# Patient Record
Sex: FEMALE | Race: WHITE | Employment: UNEMPLOYED | URBAN - METROPOLITAN AREA
[De-identification: names, ages, dates, MRNs, and addresses within clinical notes are randomized per-mention and may not be internally consistent; named-entity substitution may affect disease eponyms.]

---

## 2017-05-31 ENCOUNTER — OFFICE VISIT (OUTPATIENT)
Dept: OBGYN CLINIC | Age: 27
End: 2017-05-31

## 2017-05-31 VITALS
WEIGHT: 176 LBS | BODY MASS INDEX: 33.23 KG/M2 | HEIGHT: 61 IN | SYSTOLIC BLOOD PRESSURE: 118 MMHG | DIASTOLIC BLOOD PRESSURE: 70 MMHG

## 2017-05-31 DIAGNOSIS — Z34.81 ENCOUNTER FOR SUPERVISION OF OTHER NORMAL PREGNANCY, FIRST TRIMESTER: Primary | ICD-10-CM

## 2017-05-31 LAB
ANTIBODY SCREEN, EXTERNAL: POSITIVE
BILIRUB UR QL STRIP: NEGATIVE
CHLAMYDIA, EXTERNAL: NORMAL
GLUCOSE UR-MCNC: NEGATIVE MG/DL
HBSAG, EXTERNAL: NORMAL
HCT, EXTERNAL: 38.1
HGB, EXTERNAL: 13.2
HIV, EXTERNAL: NORMAL
KETONES P FAST UR STRIP-MCNC: NEGATIVE MG/DL
N. GONORRHEA, EXTERNAL: NORMAL
PAP SMEAR, EXTERNAL: NORMAL
PH UR STRIP: NORMAL [PH] (ref 4.6–8)
PLATELET CNT,   EXTERNAL: 306
PROT UR QL STRIP: NEGATIVE MG/DL
RUBELLA, EXTERNAL: NORMAL
SP GR UR STRIP: NORMAL (ref 1–1.03)
T. PALLIDUM, EXTERNAL: NORMAL
UA UROBILINOGEN AMB POC: NORMAL (ref 0.2–1)
URINALYSIS CLARITY POC: NORMAL
URINALYSIS COLOR POC: NORMAL
URINALYSIS, EXTERNAL: NORMAL
URINE BLOOD POC: NEGATIVE
URINE LEUKOCYTES POC: NEGATIVE
URINE NITRITES POC: NEGATIVE

## 2017-05-31 RX ORDER — ONDANSETRON 8 MG/1
8 TABLET, ORALLY DISINTEGRATING ORAL
Qty: 60 TAB | Refills: 2 | Status: SHIPPED | OUTPATIENT
Start: 2017-05-31 | End: 2017-07-21

## 2017-05-31 RX ORDER — DOXYLAMINE SUCCINATE AND PYRIDOXINE HYDROCHLORIDE, DELAYED RELEASE TABLETS 10 MG/10 MG 10; 10 MG/1; MG/1
2 TABLET, DELAYED RELEASE ORAL
Qty: 120 TAB | Refills: 6 | Status: SHIPPED | OUTPATIENT
Start: 2017-05-31 | End: 2017-06-30

## 2017-05-31 NOTE — MR AVS SNAPSHOT
Visit Information Date & Time Provider Department Dept. Phone Encounter #  
 5/31/2017  2:40 PM Vivek Gutierres MD [facility] [phone] Upcoming Health Maintenance Date Due  
 PAP AKA CERVICAL CYTOLOGY 5/23/2017 INFLUENZA AGE 9 TO ADULT 8/1/2017 Allergies as of 5/31/2017  Review Complete On: 5/31/2017 By: Vidya Cookgojamshid Severity Noted Reaction Type Reactions [place]  02/27/2015    Other (comments) Red face and body gets hot Current Immunizations  Never Reviewed Name Date Tdap 6/19/2015 10:51 AM  
  
 Not reviewed this visit You Were Diagnosed With   
  
 Codes Comments Encounter for supervision of other normal pregnancy, first trimester    -  Primary ICD-10-CM: Z34.81 ICD-9-CM: V22.1 Vitals BP Height(growth percentile) Weight(growth percentile) LMP Breastfeeding? BMI  
 118/70 5' 1\" (1.549 m) 176 lb (79.8 kg) 04/04/2017 No 33.25 kg/m2 OB Status Smoking Status Pregnant Never Smoker BMI and BSA Data Body Mass Index Body Surface Area  
 33.25 kg/m 2 1.85 m 2 Preferred Pharmacy Pharmacy Name Phone [facility] [address] [facility] Your Updated Medication List  
  
   
This list is accurate as of: 5/31/17  3:05 PM.  Always use your most recent med list.  
  
  
  
  
 famotidine 20 mg tablet Commonly known as:  PEPCID Take 1 Tab by mouth two (2) times a day. ondansetron 4 mg disintegrating tablet Commonly known as:  ZOFRAN ODT Take 1 Tab by mouth every six (6) hours as needed for Nausea. We Performed the Following AMB POC URINALYSIS DIP STICK MANUAL W/O MICRO [27145 CPT(R)] ANTIBODY SCREEN [id number] CPT(R)] CBC W/O DIFF [69563 CPT(R)] CT/NG/T.VAGINALIS AMPLIFICATION [id number] CPT(R)] CULTURE, URINE [id number] CPT(R)] HEP B SURFACE AG [id number] CPT(R)] HIV 1/2 AG/AB, 4TH GENERATION,W RFLX CONFIRM [[id number] Custom] PAP, IG, RFX HPV ASCUS (131558) [88641 CPT(R)] RUBELLA AB, IGG V4773001 CPT(R)] T PALLIDUM SCREEN W/REFLEX [[id number] Custom] TYPE, ABO & RH [06760 CPT(R)] Patient Instructions Weeks 6 to 10 of Your Pregnancy: Care Instructions Your Care Instructions Congratulations on your pregnancy. This is an exciting and important time for you. During the first 6 to 10 weeks of your pregnancy, your body goes through many changes. Your baby grows very fast, even though you cannot feel it yet. You may start to notice that you feel different, both in your body and your emotions. Because each woman's pregnancy is unique, there is no right way to feel. You may feel the healthiest you have ever been, or you may feel tired or sick to your stomach (\"morning sickness\"). These early weeks are a time to make healthy choices and to eat the best foods for you and your baby. This care sheet will give you some ideas. This is also a good time to think about birth defects testing. These are tests done during pregnancy to look for possible problems with the baby. First trimester tests for birth defects can be done between 8 and 17 weeks of pregnancy, depending on the test. Talk with your doctor about what kinds of tests are available. Follow-up care is a key part of your treatment and safety. Be sure to make and go to all appointments, and call your doctor if you are having problems. It's also a good idea to know your test results and keep a list of the medicines you take. How can you care for yourself at home? Eat well · Eat at least 3 meals and 2 healthy snacks every day. Eat fresh, whole foods, including: ¨ 7 or more servings of bread, tortillas, cereal, rice, pasta, or oatmeal. 
¨ 3 or more servings of vegetables, especially leafy green vegetables. ¨ 2 or more servings of fruits. ¨ 3 or more servings of milk, yogurt, or cheese. ¨ 2 or more servings of meat, turkey, chicken, fish, eggs, or dried beans. · Drink plenty of fluids, especially water. Avoid sodas and other sweetened drinks. · Choose foods that have important vitamins for your baby, such as calcium, iron, and folate. ¨ Dairy products, tofu, canned fish with bones, almonds, broccoli, dark leafy greens, corn tortillas, and fortified orange juice are good sources of calcium. ¨ Beef, poultry, liver, spinach, lentils, dried beans, fortified cereals, and dried fruits are rich in iron. ¨ Dark leafy greens, broccoli, asparagus, liver, fortified cereals, orange juice, peanuts, and almonds are good sources of folate. · Avoid foods that could harm your baby. ¨ Do not eat raw or undercooked meat, chicken, or fish (such as sushi or raw oysters). ¨ Do not eat raw eggs or foods that contain raw eggs, such as Caesar dressing. ¨ Do not eat soft cheeses and unpasteurized dairy foods, such as Brie, feta, or blue cheese. ¨ Do not eat fish that contains a lot of mercury, such as shark, swordfish, tilefish, or royer mackerel. Do not eat more than 6 ounces of tuna each week. ¨ Do not eat raw sprouts, especially alfalfa sprouts. ¨ Cut down on caffeine, such as coffee, tea, and cola. Protect yourself and your baby · Do not touch teetee litter or cat feces. They can cause an infection that could harm your baby. · High body temperature can be harmful to your baby. So if you want to use a sauna or hot tub, be sure to talk to your doctor about how to use it safely. [place] with morning sickness · Sip small amounts of water, juices, or shakes. Try drinking between meals, not with meals. · Eat 5 or 6 small meals a day. Try dry toast or crackers when you first get up, and eat breakfast a little later. · Avoid spicy, greasy, and fatty foods. · When you feel sick, open your windows or go for a short walk to get fresh air. · Try nausea wristbands. These help some women. · Tell your doctor if you think your prenatal vitamins make you sick. Where can you learn more? Go to http://bebo-jigna.info/. Enter G112 in the search box to learn more about \"Weeks 6 to 10 of Your Pregnancy: Care Instructions. \" Current as of: May 30, 2016 Content Version: 11.2 © 3199-1007 BoxTone. Care instructions adapted under license by [employer]t (which disclaims liability or warranty for this information). If you have questions about a medical condition or this instruction, always ask your healthcare professional. [address] any warranty or liability for your use of this information. Introducing [facility]! [facility] introduces Clipcopia patient portal. Now you can access parts of your medical record, email your doctor's office, and request medication refills online. 1. In your internet browser, go to [email or web address] 2. Click on the First Time User? Click Here link in the Sign In box. You will see the New Member Sign Up page. 3. Enter your Clipcopia Access Code exactly as it appears below. You will not need to use this code after youve completed the sign-up process. If you do not sign up before the expiration date, you must request a new code. · Clipcopia Access Code: [email or web address] Expires: 8/29/2017  1:28 PM 
 
4. Enter the last four digits of your Social Security Number (xxxx) and Date of Birth (mm/dd/yyyy) as indicated and click Submit. You will be taken to the next sign-up page. 5. Create a QFPayt ID. This will be your Clipcopia login ID and cannot be changed, so think of one that is secure and easy to remember. 6. Create a Clipcopia password. You can change your password at any time. 7. Enter your Password Reset Question and Answer. This can be used at a later time if you forget your password. 8. Enter your e-mail address.  You will receive e-mail notification when new information is available in GenieTown. 9. Click Sign Up. You can now view and download portions of your medical record. 10. Click the Download Summary menu link to download a portable copy of your medical information. If you have questions, please visit the Frequently Asked Questions section of the GenieTown website. Remember, GenieTown is NOT to be used for urgent needs. For medical emergencies, dial 911. Now available from your iPhone and Android! Please provide this summary of care documentation to your next provider. Your primary care clinician is listed as NONE. If you have any questions after today's visit, please call [phone].

## 2017-05-31 NOTE — PROGRESS NOTES
Current pregnancy history:    Mackenzie Donato is a 28 y.o. female who presents for the evaluation of pregnancy. Patient's last menstrual period was 2017. LMP history:  The date of her LMP is exact. Her last menstrual period was normal and lasted for 4 to 5 days. A urine pregnancy test was positive 4 weeks ago. She was not on the pill at conception. Based on her LMP, her EDC is 18 and her EGA is 8 weeks,1 days. Her menstrual cycles are regular and occur approximately every 28 days  and range from 3 to 5 days. The last menses lasted the usual number of days. Ultrasound data:  She had an ultrasound done by the ultrasound tech today which revealed a viable koch pregnancy with a gestational age of 11 weeks and 0 days giving an [address] of 1/10/18. TA ULTRASOUND PERFORMED. A SINGLE VIABLE 8W0D IUP IS SEEN WITH NORMAL CARDIAC RHYTHM. GESTATIONAL AGE BASED ON TODAYS US.  A NORMAL APPEARING YOLK [address] IS SEEN. RIGHT & LEFT OVARIES APPEAR WITHIN NORMAL LIMITS. NO FREE FLUID SEEN IN THE CDS  Pregnancy symptoms:    Since her LMP she has experienced  urinary frequency, breast tenderness, and nausea. She has been vomiting over the last few weeks. Associated signs and symptoms which she denies: dysuria, discharge, vaginal bleeding. She states she has gained weight:  Approximately 5 pounds over the last few weeks. Relevant past pregnancy history:   She has the followiing pregnancy history: Her last pregnancy was complicated by short cervix found at 20wks and antibody S+. She has no history of  delivery. Relevant past medical history:(relevant to this pregnancy): noncontributory. Pap/Occupational history:  Last pap smear: last year Results: Normal      Her occupation is: stay at home. Substance history: negative for alcohol, tobacco and street drugs. Positive for nothing. Exposure history: There is/are no indoor cat/s in the home.   She denies close contact with children on a regular basis. She has had chicken pox or the vaccine in the past.   Patient denies issues with domestic violence. Genetic Screening/Teratology Counseling: (Includes patient, baby's father, or anyone in either family with:)  3.  Patient's age >/= 28 at [facility]?-- no  .   2. [place], [place], [address], or  background): MCV<80?--no.     3.  Neural tube defect (meningomyelocele, spina bifida, anencephaly)?--no.   4.  Congenital heart defect?--no.  5.  Down syndrome?--no.   6.  Yosef-Sachs ([place], [place])?--no.   7.  Canavan's Disease?--no.   8.  Familial Dysautonomia?--no.   9.  Sickle cell disease or trait ()? --no   The patient has not been tested for sickle trait  10. Hemophilia or other blood disorders?--no. 11.  Muscular dystrophy?--no. 12.  Cystic fibrosis?--no. 13.  [place]'s Chorea?--no. 14.  Mental retardation/autism (if yes was person tested for Fragile X)?--no. 15.  Other inherited genetic or chromosomal disorder?--no. 12.  Maternal metabolic disorder (DM, PKU, etc)?--no. 17.  Patient or FOB with a child with a birth defect not listed above?--no.  17a. Patient or FOB with a birth defect themselves?--no. 18.  Recurrent pregnancy loss, or stillbirth?--no. 19.  Any medications since LMP other than prenatal vitamins (include vitamins, supplements, OTC meds, drugs, alcohol)?--no. 20.  Any other genetic/environmental exposure to discuss?--no. Infection History:  1. Lives with someone with TB or TB exposed?--no.   2.  Patient or partner has history of genital herpes?--no.  3.  Rash or viral illness since LMP?--no.    4.  History of STD (GC, CT, HPV, syphilis, HIV)? --no   5.  Other: OTHER?      OB History    Para Term  AB SAB TAB Ectopic Multiple Living   4 2 2  1 1 0  0 2      # Outcome Date GA Lbr Leel/2nd Weight Sex Delivery Anes PTL Lv   4 Current            3 Term 06/18/15 39w5d / 00:29 8 lb 7.3 oz (3.835 kg) M VAGINAL DELI CSE,EPIDURAL AN N Y   2 SAB 06/06/14           1 Term 03/07/12 41w0d 02:00 7 lb 10 oz (3.459 kg) F VAGINAL DELI EPI N Y          Past Medical History:   Diagnosis Date    Antibody S titer positive in second trimester, antepartum 1/9/2015    Breast cyst 2009    excision, R    Heart abnormality     pulmonary HTN with this pregnancy    Kidney disease     Nephrolithiasis     Supervision of normal subsequent pregnancy 12/23/2014    SAB in May 2014     Past Surgical History:   Procedure Laterality Date    HX WISDOM TEETH EXTRACTION       Social History     Occupational History    Not on file.      Social History Main Topics    Smoking status: Never Smoker    Smokeless tobacco: Never Used    Alcohol use No    Drug use: No    Sexual activity: Yes     Partners: Male     Birth control/ protection: None     Family History   Problem Relation Age of Onset    Cancer Maternal Grandfather      skin    Cancer Maternal Grandmother      breast       Allergies   Allergen Reactions    [place] Other (comments)     Red face and body gets hot     Prior to Admission medications    Not on File        Review of Systems: History obtained from the patient  Constitutional: negative for weight loss, fever, night sweats  HEENT: negative for hearing loss, earache, congestion, snoring, sorethroat  CV: negative for chest pain, palpitations, edema  Resp: negative for cough, shortness of breath, wheezing  Breast: negative for breast lumps, nipple discharge, galactorrhea  GI: negative for change in bowel habits, abdominal pain, black or bloody stools  : negative for frequency, dysuria, hematuria, vaginal discharge  MSK: negative for back pain, joint pain, muscle pain  Skin: negative for itching, rash, hives  Neuro: negative for dizziness, headache, confusion, weakness  Psych: negative for anxiety, depression, change in mood  Heme/lymph: negative for bleeding, bruising, pallor    Objective:  Visit Vitals    /70    Ht 5' 1\" (1.549 m)    Wt 176 lb (79.8 kg)    LMP 04/04/2017    Breastfeeding No    BMI 33.25 kg/m2       Physical Exam:   PHYSICAL EXAMINATION    Constitutional  · Appearance: well-nourished, well developed, alert, in no acute distress    HENT  · Head  · Face: appears normal  · Eyes: appear normal  · Ears: normal  · Mouth: normal  · Lips: no lesions    Neck  · Inspection/Palpation: normal appearance, no masses or tenderness  · Lymph Nodes: no lymphadenopathy present  · Thyroid: gland size normal, nontender, no nodules or masses present on palpation    Chest  · Respiratory Effort: breathing unlabored  · Auscultation: normal breath sounds    Cardiovascular  · Heart:  · Auscultation: regular rate and rhythm without murmur    Breasts  · Inspection of Breasts: breasts symmetrical, no skin changes, no discharge present, nipple appearance normal, no skin retraction present  · Palpation of Breasts and Axillae: no masses present on palpation, no breast tenderness  · Axillary Lymph Nodes: no lymphadenopathy present    Gastrointestinal  · Abdominal Examination: abdomen non-tender to palpation, normal bowel sounds, no masses present  · Liver and spleen: no hepatomegaly present, spleen not palpable  · Hernias: no hernias identified    Genitourinary  · External Genitalia: normal appearance for age, no discharge present, no tenderness present, no inflammatory lesions present, no masses present, no atrophy present  · Vagina: normal vaginal vault without central or paravaginal defects, no discharge present, no inflammatory lesions present, no masses present  · Bladder: non-tender to palpation  · Urethra: appears normal  · Cervix: normal   · Uterus: enlarged, normal shape, soft  · Adnexa: no adnexal tenderness present, no adnexal masses present  · Perineum: perineum within normal limits, no evidence of trauma, no rashes or skin lesions present  · Anus: anus within normal limits, no hemorrhoids present  · Inguinal Lymph Nodes: no lymphadenopathy present    Skin  · General Inspection: no rash, no lesions identified    Neurologic/Psychiatric  · Mental Status:  · Orientation: grossly oriented to person, place and time  · Mood and Affect: mood normal, affect appropriate  Results for orders placed or performed in visit on 05/31/17   AMB POC URINALYSIS DIP STICK MANUAL W/O MICRO   Result Value Ref Range    Color (UA POC)      Clarity (UA POC)      Glucose (UA POC) Negative Negative    Bilirubin (UA POC) Negative Negative    Ketones (UA POC) Negative Negative    Specific gravity (UA POC)  1.001 - 1.035    Blood (UA POC) Negative Negative    pH (UA POC)  4.6 - 8.0    Protein (UA POC) Negative Negative mg/dL    Urobilinogen (UA POC)  0.2 - 1    Nitrites (UA POC) Negative Negative    Leukocyte esterase (UA POC) Negative Negative       Assessment:   Intrauterine pregnancy with the following problems identified:   EDC 1/9/2018 by D=US  Pp hemorrhage after G1 - transfusion  Short cervix with G2 - no treatment, followed by MFM. Induced at 39 weeks  Pos Anit-S - check titer q4-6 weeks - MFM at 1:16        Plan:     Offered CF testing, CVS, Nuchal Translucency, MSAFP, amnio, and discussed NIPT  Course of pregnancy discussed including visit schedule, routine U/S, glucola testing, etc.  Avoid alcoholic beverages and illicit/recreational drugs use  Take prenatal vitamins or folic acid daily. Hospital and practice style discussed with coverage system. Discussed nutrition, toxoplasmosis precautions, sexual activity, exercise, need for influenza vaccine, environmental and work hazards, travel advice, screen for domestic violence, need for seat belts. Discussed seafood, unpasteurized dairy products, deli meat, artificial sweeteners, and caffeine. Information on prenatal classes/breastfeeding given. Information on circumcision given  Patient encouraged not to smoke. Discussed current prescription drug use.  Given medication list.  Discussed the use of over the counter medications and chemicals. Route of delivery discussed, including risks, benefits, and alternatives of  versus repeat LTCS. Pt understands risk of hemorrhage during pregnancy and post delivery and would accept blood products if necessary in life-threatening emergencies  Tried OTC B6/unisom did not work. Zofran, diclegis    Handouts given to pt.

## 2017-05-31 NOTE — PATIENT INSTRUCTIONS
Weeks 6 to 10 of Your Pregnancy: Care Instructions  Your Care Instructions    Congratulations on your pregnancy. This is an exciting and important time for you. During the first 6 to 10 weeks of your pregnancy, your body goes through many changes. Your baby grows very fast, even though you cannot feel it yet. You may start to notice that you feel different, both in your body and your emotions. Because each woman's pregnancy is unique, there is no right way to feel. You may feel the healthiest you have ever been, or you may feel tired or sick to your stomach (\"morning sickness\"). These early weeks are a time to make healthy choices and to eat the best foods for you and your baby. This care sheet will give you some ideas. This is also a good time to think about birth defects testing. These are tests done during pregnancy to look for possible problems with the baby. First trimester tests for birth defects can be done between 8 and 17 weeks of pregnancy, depending on the test. Talk with your doctor about what kinds of tests are available. Follow-up care is a key part of your treatment and safety. Be sure to make and go to all appointments, and call your doctor if you are having problems. It's also a good idea to know your test results and keep a list of the medicines you take. How can you care for yourself at home? Eat well  · Eat at least 3 meals and 2 healthy snacks every day. Eat fresh, whole foods, including:  ¨ 7 or more servings of bread, tortillas, cereal, rice, pasta, or oatmeal.  ¨ 3 or more servings of vegetables, especially leafy green vegetables. ¨ 2 or more servings of fruits. ¨ 3 or more servings of milk, yogurt, or cheese. ¨ 2 or more servings of meat, turkey, chicken, fish, eggs, or dried beans. · Drink plenty of fluids, especially water. Avoid sodas and other sweetened drinks. · Choose foods that have important vitamins for your baby, such as calcium, iron, and folate.   ¨ Dairy products, tofu, canned fish with bones, almonds, broccoli, dark leafy greens, corn tortillas, and fortified orange juice are good sources of calcium. ¨ Beef, poultry, liver, spinach, lentils, dried beans, fortified cereals, and dried fruits are rich in iron. ¨ Dark leafy greens, broccoli, asparagus, liver, fortified cereals, orange juice, peanuts, and almonds are good sources of folate. · Avoid foods that could harm your baby. ¨ Do not eat raw or undercooked meat, chicken, or fish (such as sushi or raw oysters). ¨ Do not eat raw eggs or foods that contain raw eggs, such as Caesar dressing. ¨ Do not eat soft cheeses and unpasteurized dairy foods, such as Brie, feta, or blue cheese. ¨ Do not eat fish that contains a lot of mercury, such as shark, swordfish, tilefish, or royer mackerel. Do not eat more than 6 ounces of tuna each week. ¨ Do not eat raw sprouts, especially alfalfa sprouts. ¨ Cut down on caffeine, such as coffee, tea, and cola. Protect yourself and your baby  · Do not touch teetee litter or cat feces. They can cause an infection that could harm your baby. · High body temperature can be harmful to your baby. So if you want to use a sauna or hot tub, be sure to talk to your doctor about how to use it safely. [place] with morning sickness  · Sip small amounts of water, juices, or shakes. Try drinking between meals, not with meals. · Eat 5 or 6 small meals a day. Try dry toast or crackers when you first get up, and eat breakfast a little later. · Avoid spicy, greasy, and fatty foods. · When you feel sick, open your windows or go for a short walk to get fresh air. · Try nausea wristbands. These help some women. · Tell your doctor if you think your prenatal vitamins make you sick. Where can you learn more? Go to http://kylie.info/. Enter G112 in the search box to learn more about \"Weeks 6 to 10 of Your Pregnancy: Care Instructions. \"  Current as of:  May 30, 2016  Content Version: 11.2  © 6031-1775 [employer]. Care instructions adapted under license by [employer] (which disclaims liability or warranty for this information). If you have questions about a medical condition or this instruction, always ask your healthcare professional. [address] any warranty or liability for your use of this information.

## 2017-06-01 ENCOUNTER — PATIENT MESSAGE (OUTPATIENT)
Dept: OBGYN CLINIC | Age: 27
End: 2017-06-01

## 2017-06-01 ENCOUNTER — TELEPHONE (OUTPATIENT)
Dept: OBGYN CLINIC | Age: 27
End: 2017-06-01

## 2017-06-01 NOTE — TELEPHONE ENCOUNTER
Call received at 8:38am      28year old patient  8w2d pregnant patient last seen in the office yesterday. Patient calling regarding her prescriptions sent to her pharmacy yesterday. Patient advised of prescriptions sent to her requested pharmacy and advised her to check with her pharmacy and if the prescription for Diclegis was denied that the pharmacy needs to fax our office the denial so the prior authorization can be worked on . Fax number provided . Patient also requesting a pregnancy confirmation letter. Patient is requesting the letter be faxed to [phone] [facility]. Letter composed as per MD order and faxed to patient provided fax number. Patient verbalized understanding. Fax confirmation received.

## 2017-06-01 NOTE — TELEPHONE ENCOUNTER
Patient paged the on-call doctor \"regarding rx\". I sent [email or web address] message regarding the Diclegis. After a few minutes I saw the patient had not read my [email or web address] message, so I called the patient and left a detailed message. Shayna Craig said she spoke with your insurance and it will take 48 hours for a response.  Continue to take the Zofran until we get an answer.  Both the Zofran and the Diclegis were sent to the [facility] [place] will let you know as soon as we hear anything.

## 2017-06-02 LAB
ABO GROUP BLD: NORMAL
BACTERIA UR CULT: NO GROWTH
BLD GP AB SCN SERPL QL: NORMAL
BLD GP AB SCN SERPL QL: POSITIVE
BLOOD GROUP ANTIBODIES SERPL: ABNORMAL
ERYTHROCYTE [DISTWIDTH] IN BLOOD BY AUTOMATED COUNT: 13.7 % (ref 12.3–15.4)
HBV SURFACE AG SERPL QL IA: NEGATIVE
HCT VFR BLD AUTO: 38.1 % (ref 34–46.6)
HGB BLD-MCNC: 13.2 G/DL (ref 11.1–15.9)
HIV 1+2 AB+HIV1 P24 AG SERPL QL IA: NON REACTIVE
MCH RBC QN AUTO: 29.4 PG (ref 26.6–33)
MCHC RBC AUTO-ENTMCNC: 34.6 G/DL (ref 31.5–35.7)
MCV RBC AUTO: 85 FL (ref 79–97)
PLATELET # BLD AUTO: 306 X10E3/UL (ref 150–379)
RBC # BLD AUTO: 4.49 X10E6/UL (ref 3.77–5.28)
RH BLD: POSITIVE
RUBV IGG SERPL IA-ACNC: 3.4 INDEX
T PALLIDUM AB SER QL IA: NEGATIVE
WBC # BLD AUTO: 13.3 X10E3/UL (ref 3.4–10.8)

## 2017-06-06 PROBLEM — Z34.81 ENCOUNTER FOR SUPERVISION OF OTHER NORMAL PREGNANCY, FIRST TRIMESTER: Status: ACTIVE | Noted: 2017-06-06

## 2017-06-09 DIAGNOSIS — O36.1919: Primary | ICD-10-CM

## 2017-06-26 ENCOUNTER — TELEPHONE (OUTPATIENT)
Dept: OBGYN CLINIC | Age: 27
End: 2017-06-26

## 2017-06-26 NOTE — TELEPHONE ENCOUNTER
Patient calling stating that she would like to discuss with Dr. Bharathi Nice' nurse regarding her MFM appointment.     Please contact patient back at [phone]

## 2017-06-27 ENCOUNTER — TELEPHONE (OUTPATIENT)
Dept: OBGYN CLINIC | Age: 27
End: 2017-06-27

## 2017-06-27 NOTE — TELEPHONE ENCOUNTER
Tried to call patient back. Unable to leave voicemail, VM not set up yet. I am working Dr. Siddhartha Beavers all day. Please try and assist patient when she calls back.

## 2017-06-27 NOTE — TELEPHONE ENCOUNTER
Call received at 8:46am      [address] 28year old  13 wod pregnant patient last seen in the office on 17    Patient denies vaginal bleeding and cramping. Patient calling to say that her throat is very sore hurts to swallow starting yesterday. Patient reports congestion and random cough for a while. Patient reports some chills but has not taken her temperature. Patient reports her 11year old is sick and she is taking her to the doctor. Patient advised that she can take Robitussin ( plain or DM) Mucinex, Guaifenesin, Sucrets, nasal saline,  Loratadine, Claritin, Benadryl and Tylenol. Patient advised to increase her po fluids and if symptoms do not improve to see her PCP. Additional recommendations.  Please advise

## 2017-06-28 ENCOUNTER — ROUTINE PRENATAL (OUTPATIENT)
Dept: OBGYN CLINIC | Age: 27
End: 2017-06-28

## 2017-06-28 ENCOUNTER — TELEPHONE (OUTPATIENT)
Dept: OBGYN CLINIC | Age: 27
End: 2017-06-28

## 2017-06-28 VITALS — SYSTOLIC BLOOD PRESSURE: 116 MMHG | BODY MASS INDEX: 33.25 KG/M2 | WEIGHT: 176 LBS | DIASTOLIC BLOOD PRESSURE: 68 MMHG

## 2017-06-28 DIAGNOSIS — Z34.81 ENCOUNTER FOR SUPERVISION OF OTHER NORMAL PREGNANCY, FIRST TRIMESTER: Primary | ICD-10-CM

## 2017-06-28 NOTE — PROGRESS NOTES
Problem List  Date Reviewed: 5/31/2017          Codes Class Noted    Encounter for supervision of other normal pregnancy, first trimester ICD-10-CM: Z34.81  ICD-9-CM: V22.1  6/6/2017    Overview Addendum 6/8/2017  4:44 PM by Vidya Shore     Intrauterine pregnancy with the following problems identified:   EDC 1/9/2018 by D=US  Pp hemorrhage after G1 - transfusion  Short cervix with G2 - no treatment, followed by MFM. Induced at 39 weeks  Pos Anit-S - check titer q4-6 weeks - MFM at 1:16  Further red cell antigen testing.  See MFM             Pulmonary HTN ([facility] [address] ICD-10-CM: I27.2  ICD-9-CM: 416.8  5/6/2015

## 2017-06-28 NOTE — TELEPHONE ENCOUNTER
Call received at 3:36pm      28year old  12w1d pregnant patient last seen in the office on 17. Patient has appointment today at 3:40. Patient calling to say that she will be a little late. Patient advised if she was more than 15 minutes late she would be asked to reschedule. Patient verbalized understanding.

## 2017-06-28 NOTE — PROGRESS NOTES
Antibody screen reacted with all reagents. Pt previously S antibody positive.  Will repeat today  See MFM for antibody issue - declines genetic testing

## 2017-06-28 NOTE — PATIENT INSTRUCTIONS

## 2017-07-06 ENCOUNTER — HOSPITAL ENCOUNTER (OUTPATIENT)
Dept: PERINATAL CARE | Age: 27
Discharge: HOME | End: 2017-07-06
Attending: OBSTETRICS & GYNECOLOGY
Payer: MEDICAID

## 2017-07-06 PROCEDURE — 76801 OB US < 14 WKS SINGLE FETUS: CPT | Performed by: OBSTETRICS & GYNECOLOGY

## 2017-07-08 ENCOUNTER — TELEPHONE (OUTPATIENT)
Dept: OBGYN CLINIC | Age: 27
End: 2017-07-08

## 2017-07-08 RX ORDER — DOXYLAMINE SUCCINATE AND PYRIDOXINE HYDROCHLORIDE, DELAYED RELEASE TABLETS 10 MG/10 MG 10; 10 MG/1; MG/1
2 TABLET, DELAYED RELEASE ORAL
Qty: 120 TAB | Refills: 6 | Status: CANCELLED | OUTPATIENT
Start: 2017-07-08 | End: 2017-08-07

## 2017-07-11 NOTE — TELEPHONE ENCOUNTER
Patient called stating a PA is needed on the [employer]. States her insurance has changed to [employer]. Informed pt that I will submit this to our PA Specialist. She verbalized understanding.

## 2017-07-13 LAB
C TRACH RRNA SPEC QL NAA+PROBE: NEGATIVE
CYTOLOGIST CVX/VAG CYTO: NORMAL
CYTOLOGY CVX/VAG DOC THIN PREP: NORMAL
CYTOLOGY HISTORY:: NORMAL
DX ICD CODE: NORMAL
LABCORP, 190119: NORMAL
Lab: NORMAL
N GONORRHOEA RRNA SPEC QL NAA+PROBE: NEGATIVE
OTHER STN SPEC: NORMAL
PATH REPORT.FINAL DX SPEC: NORMAL
STAT OF ADQ CVX/VAG CYTO-IMP: NORMAL
T VAGINALIS RRNA SPEC QL NAA+PROBE: NEGATIVE

## 2017-07-21 ENCOUNTER — ROUTINE PRENATAL (OUTPATIENT)
Dept: OBGYN CLINIC | Age: 27
End: 2017-07-21

## 2017-07-21 VITALS — DIASTOLIC BLOOD PRESSURE: 60 MMHG | BODY MASS INDEX: 32.88 KG/M2 | SYSTOLIC BLOOD PRESSURE: 116 MMHG | WEIGHT: 174 LBS

## 2017-07-21 DIAGNOSIS — Z34.82 ENCOUNTER FOR SUPERVISION OF OTHER NORMAL PREGNANCY, SECOND TRIMESTER: Primary | ICD-10-CM

## 2017-07-21 DIAGNOSIS — R76.0 ABNORMAL ANTIBODY TITER: ICD-10-CM

## 2017-07-21 RX ORDER — DOXYLAMINE SUCCINATE AND PYRIDOXINE HYDROCHLORIDE, DELAYED RELEASE TABLETS 10 MG/10 MG 10; 10 MG/1; MG/1
TABLET, DELAYED RELEASE ORAL
COMMUNITY
End: 2018-01-02

## 2017-07-21 NOTE — PROGRESS NOTES
Ab titer showed unidentified ab. Will recheck today. Need to make sure blood is crossmatched ahead for delivery.  Last del at 41 weeks and 39 week induction  Declines AFP/tetra  Has MFM ultrasound in 4 weeks

## 2017-07-21 NOTE — PROGRESS NOTES
Problem List  Date Reviewed: 6/28/2017          Codes Class Noted    Encounter for supervision of other normal pregnancy, first trimester ICD-10-CM: Z34.81  ICD-9-CM: V22.1  6/6/2017    Overview Addendum 6/8/2017  4:44 PM by Vidya Shore     Intrauterine pregnancy with the following problems identified:   EDC 1/9/2018 by D=US  Pp hemorrhage after G1 - transfusion  Short cervix with G2 - no treatment, followed by MFM. Induced at 39 weeks  Pos Anit-S - check titer q4-6 weeks - MFM at 1:16  Further red cell antigen testing.  See MFM             Pulmonary HTN ([facility] [address] ICD-10-CM: I27.2  ICD-9-CM: 416.8  5/6/2015

## 2017-07-21 NOTE — PATIENT INSTRUCTIONS

## 2017-07-26 ENCOUNTER — TELEPHONE (OUTPATIENT)
Dept: OBGYN CLINIC | Age: 27
End: 2017-07-26

## 2017-07-26 NOTE — TELEPHONE ENCOUNTER
Call received at 9:08am      28year old  16w1d pregnant patient last seen in the office on 17. Devin from [employer] regarding the lab ordered on 17. Devin advising that the lab order antigen typing is usually ordered on the father of the baby. This nurse spoke with nurse Kam Nice who called Devin from [employer] to clarify the lab order.

## 2017-07-28 LAB
BLD GP AB SCN SERPL QL: NORMAL
BLD GP AB SCN SERPL QL: POSITIVE
BLOOD GROUP ANTIBODIES SERPL: ABNORMAL
SPECIMEN STATUS REPORT, ROLRST: NORMAL

## 2017-08-24 ENCOUNTER — ROUTINE PRENATAL (OUTPATIENT)
Dept: OBGYN CLINIC | Age: 27
End: 2017-08-24

## 2017-08-24 ENCOUNTER — HOSPITAL ENCOUNTER (OUTPATIENT)
Dept: PERINATAL CARE | Age: 27
Discharge: HOME | End: 2017-08-24
Attending: OBSTETRICS & GYNECOLOGY
Payer: MEDICAID

## 2017-08-24 VITALS — SYSTOLIC BLOOD PRESSURE: 114 MMHG | DIASTOLIC BLOOD PRESSURE: 68 MMHG | WEIGHT: 178.2 LBS | BODY MASS INDEX: 33.67 KG/M2

## 2017-08-24 DIAGNOSIS — Z34.82 ENCOUNTER FOR SUPERVISION OF OTHER NORMAL PREGNANCY, SECOND TRIMESTER: Primary | ICD-10-CM

## 2017-08-24 PROCEDURE — 76805 OB US >/= 14 WKS SNGL FETUS: CPT | Performed by: OBSTETRICS & GYNECOLOGY

## 2017-08-24 NOTE — PROGRESS NOTES
Problem List  Date Reviewed: 7/21/2017          Codes Class Noted    Encounter for supervision of other normal pregnancy, first trimester ICD-10-CM: Z34.81  ICD-9-CM: V22.1  6/6/2017    Overview Addendum 7/31/2017  5:18 PM by Vidya Shore     Intrauterine pregnancy with the following problems identified:   EDC 1/9/2018 by D=US  Pp hemorrhage after G1 - transfusion  Short cervix with G2 - no treatment, followed by MFM. Induced at 39 weeks  Pos Anit-S - check titer q4-6 weeks - MFM at 1:16  Further red cell antigen testing.  See MFM - rec crossmatch blood prior to delivery  Repeat antibody screen at August appt             Pulmonary HTN [facility]) ICD-10-CM: I27.2  ICD-9-CM: 416.8  5/6/2015

## 2017-08-28 LAB
BLD GP AB SCN SERPL QL: NORMAL
BLD GP AB SCN SERPL QL: POSITIVE
BLOOD GROUP ANTIBODIES SERPL: ABNORMAL

## 2017-09-25 ENCOUNTER — ROUTINE PRENATAL (OUTPATIENT)
Dept: OBGYN CLINIC | Age: 27
End: 2017-09-25

## 2017-09-25 VITALS — SYSTOLIC BLOOD PRESSURE: 112 MMHG | WEIGHT: 182.4 LBS | DIASTOLIC BLOOD PRESSURE: 64 MMHG | BODY MASS INDEX: 34.46 KG/M2

## 2017-09-25 DIAGNOSIS — Z34.81 ENCOUNTER FOR SUPERVISION OF OTHER NORMAL PREGNANCY, FIRST TRIMESTER: Primary | ICD-10-CM

## 2017-09-25 DIAGNOSIS — Z23 ENCOUNTER FOR IMMUNIZATION: ICD-10-CM

## 2017-09-25 NOTE — PATIENT INSTRUCTIONS
Weeks 22 to 26 of Your Pregnancy: Care Instructions  Your Care Instructions    As you enter your 7th month of pregnancy at week 26, your baby's lungs are growing stronger and getting ready to breathe. You may notice that your baby responds to the sound of your or your partner's voice. You may also notice that your baby does less turning and twisting and more squirming or jerking. Jerking often means that your baby has the hiccups. Hiccups are perfectly normal and are only temporary. You may want to think about attending a childbirth preparation class. This is also a good time to start thinking about whether you want to have pain medicine during labor. Most pregnant women are tested for gestational diabetes between weeks 25 and 28. Gestational diabetes occurs when your blood sugar level gets too high when you're pregnant. The test is important, because you can have gestational diabetes and not know it. But the condition can cause problems for your baby. Follow-up care is a key part of your treatment and safety. Be sure to make and go to all appointments, and call your doctor if you are having problems. It's also a good idea to know your test results and keep a list of the medicines you take. How can you care for yourself at home? Ease discomfort from your baby's kicking  · Change your position. Sometimes this will cause your baby to change position too. · Take a deep breath while you raise your arm over your head. Then breathe out while you drop your arm. Do Kegel exercises to prevent urine from leaking  · You can do Kegel exercises while you stand or sit. ¨ Squeeze the same muscles you would use to stop your urine. Your belly and thighs should not move. ¨ Hold the squeeze for 3 seconds, and then relax for 3 seconds. ¨ Start with 3 seconds. Then add 1 second each week until you are able to squeeze for 10 seconds. ¨ Repeat the exercise 10 to 15 times for each session.  Do three or more sessions each day.  Ease or reduce swelling in your feet, ankles, hands, and fingers  · If your fingers are puffy, take off your rings. · Do not eat high-salt foods, such as potato chips. · Prop up your feet on a stool or couch as much as possible. Sleep with pillows under your feet. · Do not stand for long periods of time or wear tight shoes. · Wear support stockings. Where can you learn more? Go to http://bebo-jigna.info/. Enter G264 in the search box to learn more about \"Weeks 22 to 26 of Your Pregnancy: Care Instructions. \"  Current as of: March 16, 2017  Content Version: 11.3  © 9237-5855 [employer], [employer]. Care instructions adapted under license by [employer] (which disclaims liability or warranty for this information). If you have questions about a medical condition or this instruction, always ask your healthcare professional. [place] [address] any warranty or liability for your use of this information.

## 2017-09-25 NOTE — PROGRESS NOTES
Administered flu vaccine per MD order. Patient consent signed. Injection given IM in left deltoid. Patient tolerated well, no complications. Problem List  Date Reviewed: 8/24/2017          Codes Class Noted    Encounter for supervision of other normal pregnancy, first trimester ICD-10-CM: Z34.81  ICD-9-CM: V22.1  6/6/2017    Overview Addendum 9/25/2017  4:07 PM by Cristhian Foster LPN     Intrauterine pregnancy with the following problems identified:   EDC 1/9/2018 by D=US  Pp hemorrhage after G1 - transfusion  Short cervix with G2 - no treatment, followed by MFM. Induced at 39 weeks  Pos Anit-S - check titer q4-6 weeks - MFM at 1:16  Further red cell antigen testing.  See MFM - rec crossmatch blood prior to delivery  Repeat antibody screen in 4 weeks-September  Flu vaccine today             Pulmonary HTN ([facility] [address] ICD-10-CM: I27.2  ICD-9-CM: 416.8  5/6/2015

## 2017-10-06 ENCOUNTER — HOSPITAL ENCOUNTER (OUTPATIENT)
Age: 27
Setting detail: OBSERVATION
Discharge: HOME | End: 2017-10-06
Attending: OBSTETRICS & GYNECOLOGY | Admitting: OBSTETRICS & GYNECOLOGY
Payer: MEDICAID

## 2017-10-06 VITALS
BODY MASS INDEX: 34.36 KG/M2 | DIASTOLIC BLOOD PRESSURE: 63 MMHG | RESPIRATION RATE: 16 BRPM | HEIGHT: 61 IN | HEART RATE: 98 BPM | WEIGHT: 182 LBS | OXYGEN SATURATION: 98 % | TEMPERATURE: 97.5 F | SYSTOLIC BLOOD PRESSURE: 103 MMHG

## 2017-10-06 PROBLEM — Z34.90 PREGNANCY: Status: ACTIVE | Noted: 2017-10-06

## 2017-10-06 LAB
ATRIAL RATE: 102 BPM
CALCULATED P AXIS, ECG09: 4 DEGREES
CALCULATED R AXIS, ECG10: 61 DEGREES
CALCULATED T AXIS, ECG11: 26 DEGREES
DIAGNOSIS, 93000: NORMAL
P-R INTERVAL, ECG05: 122 MS
Q-T INTERVAL, ECG07: 350 MS
QRS DURATION, ECG06: 94 MS
QTC CALCULATION (BEZET), ECG08: 456 MS
VENTRICULAR RATE, ECG03: 102 BPM

## 2017-10-06 PROCEDURE — 99218 HC RM OBSERVATION: CPT

## 2017-10-06 PROCEDURE — 99283 EMERGENCY DEPT VISIT LOW MDM: CPT | Performed by: OBSTETRICS & GYNECOLOGY

## 2017-10-06 PROCEDURE — 93005 ELECTROCARDIOGRAM TRACING: CPT

## 2017-10-06 PROCEDURE — 75810000275 HC EMERGENCY DEPT VISIT NO LEVEL OF CARE

## 2017-10-06 RX ORDER — ACETAMINOPHEN 325 MG/1
650 TABLET ORAL
Status: DISCONTINUED | OUTPATIENT
Start: 2017-10-06 | End: 2017-10-06 | Stop reason: HOSPADM

## 2017-10-06 NOTE — H&P
Pt is a 28 y. o.female. [email or web address] patient presents with complaint of chest and back pain. Pt had an EKg in the ED which was negative. The patient denies SOB or contractions. The patient denies LOF, vaginal bleeding, N/V/F/C. Pt reports good fetal movement. Pregnancy uncomplicated. Past Medical History:   Diagnosis Date    Antibody S titer positive in second trimester, antepartum 1/9/2015    Breast cyst 2009    excision, R    Gestational hypertension     with second pregnancy    Heart abnormality     pulmonary HTN with second pregnancy    Supervision of normal subsequent pregnancy 12/23/2014    SAB in May 2014       Visit Vitals    /63    Pulse 98    Temp 97.5 °F (36.4 °C)    Resp 16    Ht 5' 1\" (1.549 m)    Wt 82.6 kg (182 lb)    SpO2 98%    BMI 34.39 kg/m2       No data found.            EXAM:    Membranes:  Intact  Uterine Activity: None  Fetal Heart Rate: Baseline: 140 per minute          ASSESSMENT:    Chest pain-resolved  Discharge home        PLAN:

## 2017-10-06 NOTE — PROGRESS NOTES
Pt arrived to L&D from ED with c/o chest pain and lower back pain that started around 0130 this morning. Pt states the chest pain was initially sharp and stabbing, but currently is more dull and achey Pt rates the chest pain as 6/10, but states when it was sharp it was about an 8/10. Pt states her back pain in intermittent and rates it 8/10. Pt states she has a history of pulmonary hypertension with her last pregnancy which caused her to have chest pains and come in to the hospital which lead to the pulmonary htn being diagnosed. Pt also states she has antibody S after being transfused after her first delivery and has been seen by MFM for that reason during this pregnancy. Pt denies LOF and vaginal bleeding. 7806 Dr. Jonathan Wood notified of pt's arrival, complaints, history, vs, current status. Per MD, monitor pt for a few hours, PO hydrate, monitor BP's, and pt may have tylenol for back pain. 0340 Pt declined tylenol at this time. 0432 Dr. Jonathan Wood at bedside to assess pt. Pt states her chest pain is now a 2/10 on the pain scale. Discharge order received.  0600 Discharge instructions given, opportunity for questions and clarification given. Pt educated to return to ED if symptoms reoccur or worsen. Pt verbalized understanding. 0153 Pt left unit ambulatory in stable condition with .

## 2017-10-06 NOTE — DISCHARGE INSTRUCTIONS
Keep regularly scheduled office appointment. Weeks 26 to 30 of Your Pregnancy: Care Instructions  Your Care Instructions    You are now in your last trimester of pregnancy. Your baby is growing rapidly. And you'll probably feel your baby moving around more often. Your doctor may ask you to count your baby's kicks. Your back may ache as your body gets used to your baby's size and length. If you haven't already had the Tdap shot during this pregnancy, talk to your doctor about getting it. It will help protect your  against pertussis infection. During this time, it's important to take care of yourself and pay attention to what your body needs. If you feel sexual, explore ways to be close with your partner that match your comfort and desire. Use the tips provided in this care sheet to find ways to be sexual in your own way. Follow-up care is a key part of your treatment and safety. Be sure to make and go to all appointments, and call your doctor if you are having problems. It's also a good idea to know your test results and keep a list of the medicines you take. How can you care for yourself at home? Take it easy at work  · Take frequent breaks. If possible, stop working when you are tired, and rest during your lunch hour. · Take bathroom breaks every 2 hours. · Change positions often. If you sit for long periods, stand up and walk around. · When you stand for a long time, keep one foot on a low stool with your knee bent. After standing a lot, sit with your feet up. · Avoid fumes, chemicals, and tobacco smoke. Be sexual in your own way  · Having sex during pregnancy is okay, unless your doctor tells you not to. · You may be very interested in sex, or you may have no interest at all. · Your growing belly can make it hard to find a good position during intercourse. [place] and explore. · You may get cramps in your uterus when your partner touches your breasts.   · A back rub may relieve the backache or cramps that sometimes follow orgasm. Learn about  labor  · Watch for signs of  labor. You may be going into labor if:  ¨ You have menstrual-like cramps, with or without nausea. ¨ You have about 4 or more contractions in 20 minutes, or about 8 or more within 1 hour, even after you have had a glass of water and are resting. ¨ You have a low, dull backache that does not go away when you change your position. ¨ You have pain or pressure in your pelvis that comes and goes in a pattern. ¨ You have intestinal cramping or flu-like symptoms, with or without diarrhea. ¨ You notice an increase or change in your vaginal discharge. Discharge may be heavy, mucus-like, watery, or streaked with blood. ¨ Your water breaks. · If you think you have  labor:  ¨ Drink 2 or 3 glasses of water or juice. Not drinking enough fluids can cause contractions. ¨ Stop what you are doing, and empty your bladder. Then lie down on your left side for at least 1 hour. ¨ While lying on your side, find your breast bone. Put your fingers in the soft spot just below it. Move your fingers down toward your belly button to find the top of your uterus. Check to see if it is tight. ¨ Contractions can be weak or strong. Record your contractions for an hour. Time a contraction from the start of one contraction to the start of the next one. ¨ Single or several strong contractions without a pattern are called [place] contractions. They are practice contractions but not the start of labor. They often stop if you change what you are doing. ¨ Call your doctor if you have regular contractions. Where can you learn more? Go to http://bebo-jigna.info/. Enter B732 in the search box to learn more about \"Weeks 26 to 30 of Your Pregnancy: Care Instructions. \"  Current as of: 2017  Content Version: 11.3  © 0146-0178 [employer], [employer].  Care instructions adapted under license by [facility] (which disclaims liability or warranty for this information). If you have questions about a medical condition or this instruction, always ask your healthcare professional. [address] any warranty or liability for your use of this information. Pregnancy Precautions: Care Instructions  Your Care Instructions  There is no sure way to prevent labor before your due date ( labor) or to prevent most other pregnancy problems. But there are things you can do to increase your chances of a healthy pregnancy. Go to your appointments, follow your doctor's advice, and take good care of yourself. Eat well, and exercise (if your doctor agrees). And make sure to drink plenty of water. Follow-up care is a key part of your treatment and safety. Be sure to make and go to all appointments, and call your doctor if you are having problems. It's also a good idea to know your test results and keep a list of the medicines you take. How can you care for yourself at home? · Make sure you go to your prenatal appointments. At each visit, your doctor will check your blood pressure. Your doctor will also check to see if you have protein in your urine. High blood pressure and protein in urine are signs of preeclampsia. This condition can be dangerous for you and your baby. · Drink plenty of fluids, enough so that your urine is light yellow or clear like water. Dehydration can cause contractions. If you have kidney, heart, or liver disease and have to limit fluids, talk with your doctor before you increase the amount of fluids you drink. · Tell your doctor right away if you notice any symptoms of an infection, such as:  ¨ Burning when you urinate. ¨ A foul-smelling discharge from your vagina. ¨ Vaginal itching. ¨ Unexplained fever. ¨ Unusual pain or soreness in your uterus or lower belly. · Eat a balanced diet. Include plenty of foods that are high in calcium and iron.   ¨ Foods high in calcium include milk, cheese, yogurt, almonds, and broccoli. ¨ Foods high in iron include red meat, shellfish, poultry, eggs, beans, raisins, whole-grain bread, and leafy green vegetables. · Do not smoke. If you need help quitting, talk to your doctor about stop-smoking programs and medicines. These can increase your chances of quitting for good. · Do not drink alcohol or use illegal drugs. · Follow your doctor's directions about activity. Your doctor will let you know how much, if any, exercise you can do. · Ask your doctor if you can have sex. If you are at risk for early labor, your doctor may ask you to not have sex. · Take care to prevent falls. During pregnancy, your joints are loose, and your balance is off. Sports such as bicycling, skiing, or in-line skating can increase your risk of falling. And don't ride horses or motorcycles, dive, water ski, scuba dive, or parachute jump while you are pregnant. · Avoid getting very hot. Do not use saunas or hot tubs. Avoid staying out in the sun in hot weather for long periods. Take acetaminophen (Tylenol) to lower a high fever. · Do not take any over-the-counter or herbal medicines or supplements without talking to your doctor or pharmacist first.  When should you call for help? Call 911 anytime you think you may need emergency care. For example, call if:  · You passed out (lost consciousness). · You have severe vaginal bleeding. · You have severe pain in your belly or pelvis. · You have had fluid gushing or leaking from your vagina and you know or think the umbilical cord is bulging into your vagina. If this happens, immediately get down on your knees so your rear end (buttocks) is higher than your head. This will decrease the pressure on the cord until help arrives. Call your doctor now or seek immediate medical care if:  · You have signs of preeclampsia, such as:  ¨ Sudden swelling of your face, hands, or feet.   ¨ New vision problems (such as dimness or blurring). ¨ A severe headache. · You have any vaginal bleeding. · You have belly pain or cramping. · You have a fever. · You have had regular contractions (with or without pain) for an hour. This means that you have 8 or more within 1 hour or 4 or more in 20 minutes after you change your position and drink fluids. · You have a sudden release of fluid from your vagina. · You have low back pain or pelvic pressure that does not go away. · You notice that your baby has stopped moving or is moving much less than normal.  Watch closely for changes in your health, and be sure to contact your doctor if you have any problems. Where can you learn more? Go to http://bebo-jigna.info/. Enter [id number] in the search box to learn more about \"Pregnancy Precautions: Care Instructions. \"  Current as of: March 16, 2017  Content Version: 11.3  © 3269-1823 [employer]. Care instructions adapted under license by [employer] (which disclaims liability or warranty for this information). If you have questions about a medical condition or this instruction, always ask your healthcare professional. [place] [address] any warranty or liability for your use of this information.

## 2017-10-06 NOTE — IP AVS SNAPSHOT
Héctor Egdanyell 
 
 
 [address] [facility] 
[phone] Patient: Kimberlee Argueta MRN: [id number] :1990 You are allergic to the following Allergen Reactions Lulu Other (comments) Red face and body gets hot Recent Documentation Height Weight BMI OB Status Smoking Status 1.549 m 82.6 kg 34.39 kg/m2 Pregnant Never Smoker Emergency Contacts Name Discharge Info Relation Home Work Mobile Kishan Mireles DISCHARGE CAREGIVER [3] Spouse [3] [phone] About your hospitalization You were admitted on:  2017 You last received care in the:  [facility] 2 LABOR & DELIVERY You were discharged on:  2017 Unit phone number:  [phone] Why you were hospitalized Your primary diagnosis was:  Not on File Your diagnoses also included:  Pregnancy Providers Seen During Your Hospitalizations Provider Role Specialty Primary office phone Perri Benson MD Attending Provider Obstetrics & Gynecology [phone] Your Primary Care Physician (PCP) Primary Care Physician Office Phone Office Fax Case Garcia [phone] Follow-up Information None Your Appointments 2017  3:10 PM EDT  
OB VISIT with Perri Benson MD  
[facility] ([address]) [address] [facility]  
[phone] Current Discharge Medication List  
  
ASK your doctor about these medications Dose & Instructions Dispensing Information Comments Morning Noon Evening Bedtime DICLEGIS 10-10 mg Tbec DR tablet Generic drug:  doxylamine-pyridoxine (vit B6) Your last dose was: Your next dose is: Take  by mouth. Refills:  0  
     
   
   
   
  
 PNV No12-Iron-FA-DSS-OM-3 29 mg iron-1 mg -50 mg Cpkd Your last dose was: Your next dose is: Take  by mouth. Refills:  0 Discharge Instructions Keep regularly scheduled office appointment. Weeks 26 to 30 of Your Pregnancy: Care Instructions Your Care Instructions You are now in your last trimester of pregnancy. Your baby is growing rapidly. And you'll probably feel your baby moving around more often. Your doctor may ask you to count your baby's kicks. Your back may ache as your body gets used to your baby's size and length. If you haven't already had the Tdap shot during this pregnancy, talk to your doctor about getting it. It will help protect your  against pertussis infection. During this time, it's important to take care of yourself and pay attention to what your body needs. If you feel sexual, explore ways to be close with your partner that match your comfort and desire. Use the tips provided in this care sheet to find ways to be sexual in your own way. Follow-up care is a key part of your treatment and safety. Be sure to make and go to all appointments, and call your doctor if you are having problems. It's also a good idea to know your test results and keep a list of the medicines you take. How can you care for yourself at home? Take it easy at work · Take frequent breaks. If possible, stop working when you are tired, and rest during your lunch hour. · Take bathroom breaks every 2 hours. · Change positions often. If you sit for long periods, stand up and walk around. · When you stand for a long time, keep one foot on a low stool with your knee bent. After standing a lot, sit with your feet up. · Avoid fumes, chemicals, and tobacco smoke. Be sexual in your own way · Having sex during pregnancy is okay, unless your doctor tells you not to. · You may be very interested in sex, or you may have no interest at all. · Your growing belly can make it hard to find a good position during intercourse. [place] and explore. · You may get cramps in your uterus when your partner touches your breasts. · A back rub may relieve the backache or cramps that sometimes follow orgasm. Learn about  labor · Watch for signs of  labor. You may be going into labor if: 
¨ You have menstrual-like cramps, with or without nausea. ¨ You have about 4 or more contractions in 20 minutes, or about 8 or more within 1 hour, even after you have had a glass of water and are resting. ¨ You have a low, dull backache that does not go away when you change your position. ¨ You have pain or pressure in your pelvis that comes and goes in a pattern. ¨ You have intestinal cramping or flu-like symptoms, with or without diarrhea. ¨ You notice an increase or change in your vaginal discharge. Discharge may be heavy, mucus-like, watery, or streaked with blood. ¨ Your water breaks. · If you think you have  labor: ¨ Drink 2 or 3 glasses of water or juice. Not drinking enough fluids can cause contractions. ¨ Stop what you are doing, and empty your bladder. Then lie down on your left side for at least 1 hour. ¨ While lying on your side, find your breast bone. Put your fingers in the soft spot just below it. Move your fingers down toward your belly button to find the top of your uterus. Check to see if it is tight. ¨ Contractions can be weak or strong. Record your contractions for an hour. Time a contraction from the start of one contraction to the start of the next one. ¨ Single or several strong contractions without a pattern are called McDonough-Vieira contractions. They are practice contractions but not the start of labor. They often stop if you change what you are doing. ¨ Call your doctor if you have regular contractions. Where can you learn more? Go to http://bebo-jigna.info/. Enter P130 in the search box to learn more about \"Weeks 26 to 30 of Your Pregnancy: Care Instructions. \" Current as of: 2017 Content Version: 11.3 © 2415-6583 YABUY. Care instructions adapted under license by "[employer] (which disclaims liability or warranty for this information). If you have questions about a medical condition or this instruction, always ask your healthcare professional. [place] [address] any warranty or liability for your use of this information. Pregnancy Precautions: Care Instructions Your Care Instructions There is no sure way to prevent labor before your due date ( labor) or to prevent most other pregnancy problems. But there are things you can do to increase your chances of a healthy pregnancy. Go to your appointments, follow your doctor's advice, and take good care of yourself. Eat well, and exercise (if your doctor agrees). And make sure to drink plenty of water. Follow-up care is a key part of your treatment and safety. Be sure to make and go to all appointments, and call your doctor if you are having problems. It's also a good idea to know your test results and keep a list of the medicines you take. How can you care for yourself at home? · Make sure you go to your prenatal appointments. At each visit, your doctor will check your blood pressure. Your doctor will also check to see if you have protein in your urine. High blood pressure and protein in urine are signs of preeclampsia. This condition can be dangerous for you and your baby. · Drink plenty of fluids, enough so that your urine is light yellow or clear like water. Dehydration can cause contractions. If you have kidney, heart, or liver disease and have to limit fluids, talk with your doctor before you increase the amount of fluids you drink. · Tell your doctor right away if you notice any symptoms of an infection, such as: ¨ Burning when you urinate. ¨ A foul-smelling discharge from your vagina. ¨ Vaginal itching. ¨ Unexplained fever. ¨ Unusual pain or soreness in your uterus or lower belly. · Eat a balanced diet. Include plenty of foods that are high in calcium and iron. ¨ Foods high in calcium include milk, cheese, yogurt, almonds, and broccoli. ¨ Foods high in iron include red meat, shellfish, poultry, eggs, beans, raisins, whole-grain bread, and leafy green vegetables. · Do not smoke. If you need help quitting, talk to your doctor about stop-smoking programs and medicines. These can increase your chances of quitting for good. · Do not drink alcohol or use illegal drugs. · Follow your doctor's directions about activity. Your doctor will let you know how much, if any, exercise you can do. · Ask your doctor if you can have sex. If you are at risk for early labor, your doctor may ask you to not have sex. · Take care to prevent falls. During pregnancy, your joints are loose, and your balance is off. Sports such as bicycling, skiing, or in-line skating can increase your risk of falling. And don't ride horses or motorcycles, dive, water ski, scuba dive, or parachute jump while you are pregnant. · Avoid getting very hot. Do not use saunas or hot tubs. Avoid staying out in the sun in hot weather for long periods. Take acetaminophen (Tylenol) to lower a high fever. · Do not take any over-the-counter or herbal medicines or supplements without talking to your doctor or pharmacist first. 
When should you call for help? Call 911 anytime you think you may need emergency care. For example, call if: 
· You passed out (lost consciousness). · You have severe vaginal bleeding. · You have severe pain in your belly or pelvis. · You have had fluid gushing or leaking from your vagina and you know or think the umbilical cord is bulging into your vagina.  If this happens, immediately get down on your knees so your rear end (buttocks) is higher than your head. This will decrease the pressure on the cord until help arrives. Call your doctor now or seek immediate medical care if: 
· You have signs of preeclampsia, such as: 
¨ Sudden swelling of your face, hands, or feet. ¨ New vision problems (such as dimness or blurring). ¨ A severe headache. · You have any vaginal bleeding. · You have belly pain or cramping. · You have a fever. · You have had regular contractions (with or without pain) for an hour. This means that you have 8 or more within 1 hour or 4 or more in 20 minutes after you change your position and drink fluids. · You have a sudden release of fluid from your vagina. · You have low back pain or pelvic pressure that does not go away. · You notice that your baby has stopped moving or is moving much less than normal. 
Watch closely for changes in your health, and be sure to contact your doctor if you have any problems. Where can you learn more? Go to http://bebo-jigna.info/. Enter [id number] in the search box to learn more about \"Pregnancy Precautions: Care Instructions. \" Current as of: March 16, 2017 Content Version: 11.3 © 8870-3565 Zoobe. Care instructions adapted under license by Socialance (which disclaims liability or warranty for this information). If you have questions about a medical condition or this instruction, always ask your healthcare professional. [place] [address] any warranty or liability for your use of this information. Discharge Orders None Introducing [facility] & HEALTH SERVICES! Dear Inna Pérez: Thank you for requesting a Klooff account. Our records indicate that you already have an active Klooff account. You can access your account anytime at [email or web address] Did you know that you can access your hospital and ER discharge instructions at any time in MyChart? You can also review all of your test results from your hospital stay or ER visit. Additional Information If you have questions, please visit the Frequently Asked Questions section of the PlantSense website at [email or web address] Remember, MyChart is NOT to be used for urgent needs. For medical emergencies, dial 911. Now available from your iPhone and Android! General Information Please provide this summary of care documentation to your next provider. Patient Signature:  ____________________________________________________________ Date:  ____________________________________________________________  
  
[facility] Provider Signature:  ____________________________________________________________ Date:  ____________________________________________________________

## 2017-10-06 NOTE — ED TRIAGE NOTES
Patient 26 weeks pregnant  Arrives with complaint of lower back pain and chest pain.  Patient has history of HTN   Patient roomed immediately for EKG

## 2017-10-06 NOTE — IP AVS SNAPSHOT
[address] 
[phone] Patient: Colleen Whitt MRN: [id number] :1990 Current Discharge Medication List  
  
ASK your doctor about these medications Dose & Instructions Dispensing Information Comments Morning Noon Evening Bedtime DICLEGIS 10-10 mg Tbec DR tablet Generic drug:  doxylamine-pyridoxine (vit B6) Your last dose was: Your next dose is: Take  by mouth. Refills:  0  
     
   
   
   
  
 PNV No12-Iron-FA-DSS-OM-3 29 mg iron-1 mg -50 mg Cpkd Your last dose was: Your next dose is: Take  by mouth. Refills:  0

## 2017-10-15 LAB — GTT, 1 HR, GLUCOLA, EXTERNAL: NORMAL

## 2017-10-23 ENCOUNTER — ROUTINE PRENATAL (OUTPATIENT)
Dept: OBGYN CLINIC | Age: 27
End: 2017-10-23

## 2017-10-23 VITALS — DIASTOLIC BLOOD PRESSURE: 60 MMHG | BODY MASS INDEX: 34.92 KG/M2 | SYSTOLIC BLOOD PRESSURE: 108 MMHG | WEIGHT: 184.8 LBS

## 2017-10-23 DIAGNOSIS — R76.0 ABNORMAL ANTIBODY TITER: ICD-10-CM

## 2017-10-23 DIAGNOSIS — Z34.83 ENCOUNTER FOR SUPERVISION OF OTHER NORMAL PREGNANCY, THIRD TRIMESTER: Primary | ICD-10-CM

## 2017-10-23 DIAGNOSIS — Z23 ENCOUNTER FOR IMMUNIZATION: ICD-10-CM

## 2017-10-23 LAB
HCT, EXTERNAL: 32
HGB, EXTERNAL: 10.4
PLATELET CNT,   EXTERNAL: 268

## 2017-10-23 RX ORDER — NYSTATIN AND TRIAMCINOLONE ACETONIDE 100000; 1 [USP'U]/G; MG/G
CREAM TOPICAL
Qty: 30 G | Refills: 1 | Status: SHIPPED | OUTPATIENT
Start: 2017-10-23 | End: 2017-12-26

## 2017-10-23 NOTE — PATIENT INSTRUCTIONS
Weeks 22 to 26 of Your Pregnancy: Care Instructions  Your Care Instructions    As you enter your 7th month of pregnancy at week 26, your baby's lungs are growing stronger and getting ready to breathe. You may notice that your baby responds to the sound of your or your partner's voice. You may also notice that your baby does less turning and twisting and more squirming or jerking. Jerking often means that your baby has the hiccups. Hiccups are perfectly normal and are only temporary. You may want to think about attending a childbirth preparation class. This is also a good time to start thinking about whether you want to have pain medicine during labor. Most pregnant women are tested for gestational diabetes between weeks 25 and 28. Gestational diabetes occurs when your blood sugar level gets too high when you're pregnant. The test is important, because you can have gestational diabetes and not know it. But the condition can cause problems for your baby. Follow-up care is a key part of your treatment and safety. Be sure to make and go to all appointments, and call your doctor if you are having problems. It's also a good idea to know your test results and keep a list of the medicines you take. How can you care for yourself at home? Ease discomfort from your baby's kicking  · Change your position. Sometimes this will cause your baby to change position too. · Take a deep breath while you raise your arm over your head. Then breathe out while you drop your arm. Do Kegel exercises to prevent urine from leaking  · You can do Kegel exercises while you stand or sit. ¨ Squeeze the same muscles you would use to stop your urine. Your belly and thighs should not move. ¨ Hold the squeeze for 3 seconds, and then relax for 3 seconds. ¨ Start with 3 seconds. Then add 1 second each week until you are able to squeeze for 10 seconds. ¨ Repeat the exercise 10 to 15 times for each session.  Do three or more sessions each day.  Ease or reduce swelling in your feet, ankles, hands, and fingers  · If your fingers are puffy, take off your rings. · Do not eat high-salt foods, such as potato chips. · Prop up your feet on a stool or couch as much as possible. Sleep with pillows under your feet. · Do not stand for long periods of time or wear tight shoes. · Wear support stockings. Where can you learn more? Go to http://bebo-jigna.info/. Enter G264 in the search box to learn more about \"Weeks 22 to 26 of Your Pregnancy: Care Instructions. \"  Current as of: March 16, 2017  Content Version: 11.3  © 7807-4288 PerioSeal, [employer]-V [employer]. Care instructions adapted under license by Violet (which disclaims liability or warranty for this information). If you have questions about a medical condition or this instruction, always ask your healthcare professional. [place] [address] any warranty or liability for your use of this information.

## 2017-10-23 NOTE — PROGRESS NOTES
Problem List  Date Reviewed: 9/25/2017          Codes Class Noted    Pregnancy ICD-10-CM: Z34.90  ICD-9-CM: V22.2  10/6/2017        Encounter for supervision of other normal pregnancy, first trimester ICD-10-CM: Z34.81  ICD-9-CM: V22.1  6/6/2017    Overview Addendum 9/28/2017  4:46 PM by Vidya OVALLES Angelajamshid     Intrauterine pregnancy with the following problems identified:   EDC 1/9/2018 by D=US  Pp hemorrhage after G1 - transfusion  Short cervix with G2 - no treatment, followed by MFM. Induced at 39 weeks  Pos Anit-S - check titer q4-6 weeks - MFM at 1:16  Further red cell antigen testing. See MFM - rec crossmatch blood prior to delivery  Repeat antibody screen in 4 weeks-September; repeat antibody screen with glucola  Flu vaccine today             Pulmonary HTN ICD-10-CM: I27.20  ICD-9-CM: 416.8  5/6/2015            Administered TDAP vaccine per MD order. Patient consent signed. Injection given IM in right deltoid. Patient tolerated well, no complications.

## 2017-10-26 ENCOUNTER — TELEPHONE (OUTPATIENT)
Dept: OBGYN CLINIC | Age: 27
End: 2017-10-26

## 2017-10-26 LAB
BLD GP AB SCN SERPL QL: NORMAL
BLD GP AB SCN SERPL QL: POSITIVE
BLOOD GROUP ANTIBODIES SERPL: ABNORMAL
ERYTHROCYTE [DISTWIDTH] IN BLOOD BY AUTOMATED COUNT: 14.5 % (ref 12.3–15.4)
GLUCOSE 1H P 50 G GLC PO SERPL-MCNC: 130 MG/DL (ref 65–139)
HCT VFR BLD AUTO: 32 % (ref 34–46.6)
HGB BLD-MCNC: 10.4 G/DL (ref 11.1–15.9)
MCH RBC QN AUTO: 27.6 PG (ref 26.6–33)
MCHC RBC AUTO-ENTMCNC: 32.5 G/DL (ref 31.5–35.7)
MCV RBC AUTO: 85 FL (ref 79–97)
PLATELET # BLD AUTO: 268 X10E3/UL (ref 150–379)
RBC # BLD AUTO: 3.77 X10E6/UL (ref 3.77–5.28)
WBC # BLD AUTO: 14.1 X10E3/UL (ref 3.4–10.8)

## 2017-10-26 NOTE — TELEPHONE ENCOUNTER
Call received at 9:28am      28year old  29w2d pregnant patient last seen in the office on 10/23/17.      Patient calling to check on her one hour glucose testing results      Please advise

## 2017-10-27 DIAGNOSIS — Z34.81 ENCOUNTER FOR SUPERVISION OF OTHER NORMAL PREGNANCY, FIRST TRIMESTER: ICD-10-CM

## 2017-10-27 NOTE — PROGRESS NOTES
Recorded labs. Added to problem list. Left message for patient to call in reference to labs results and Dr's recommendation to take an additional iron pill.

## 2017-11-06 ENCOUNTER — ROUTINE PRENATAL (OUTPATIENT)
Dept: OBGYN CLINIC | Age: 27
End: 2017-11-06

## 2017-11-06 VITALS — SYSTOLIC BLOOD PRESSURE: 110 MMHG | BODY MASS INDEX: 35.14 KG/M2 | WEIGHT: 186 LBS | DIASTOLIC BLOOD PRESSURE: 68 MMHG

## 2017-11-06 DIAGNOSIS — Z34.83 ENCOUNTER FOR SUPERVISION OF OTHER NORMAL PREGNANCY, THIRD TRIMESTER: Primary | ICD-10-CM

## 2017-11-06 NOTE — MR AVS SNAPSHOT
Visit Information Date & Time Provider Department Dept. Phone Encounter #  
 11/6/2017  3:20 PM MD James Castro [phone] [id number] Your Appointments 11/20/2017  2:50 PM  
[facility] with MD [facility] Appt Note: 32wks AH  
 [phone]  
[facility] 31 [address] Upcoming Health Maintenance Date Due  
 PAP AKA CERVICAL CYTOLOGY 5/31/2020 Allergies as of 11/6/2017  Review Complete On: 11/6/2017 By: Ricky Blair RN Severity Noted Reaction Type Reactions [place]  02/27/2015    Other (comments) Red face and body gets hot Current Immunizations  Never Reviewed Name Date Influenza Vaccine (Quad) PF 9/25/2017 Tdap 10/23/2017, 6/19/2015 10:51 AM  
  
 Not reviewed this visit Vitals BP Weight(growth percentile) LMP BMI OB Status Smoking Status 110/68 186 lb (84.4 kg) 04/04/2017 35.14 kg/m2 Pregnant Never Smoker BMI and BSA Data Body Mass Index Body Surface Area  
 35.14 kg/m 2 1.91 m 2 Preferred Pharmacy Pharmacy Name Phone [facility] [address] [place] [address] [phone] Your Updated Medication List  
  
   
This list is accurate as of: 11/6/17  3:32 PM.  Always use your most recent med list.  
  
  
  
  
 DICLEGIS 10-10 mg Tbec DR tablet Generic drug:  doxylamine-pyridoxine (vit B6) Take  by mouth. nystatin-triamcinolone topical cream  
Commonly known as:  MYCOLOG II Apply  to affected area three (3) times daily as needed for Skin Irritation. PNV No12-Iron-FA-DSS-OM-3 29 mg iron-1 mg -50 mg Cpkd Take  by mouth. Patient Instructions Weeks 30 to 32 of Your Pregnancy: Care Instructions Your Care Instructions You have made it to the final months of your pregnancy. By now, your baby is really starting to look like a baby, with hair and plump skin. As you enter the final weeks of pregnancy, the reality of having a baby may start to set in. This is the time to settle on a name, get your household in order, set up a safe nursery, and find quality  if needed. Doing these things in advance will allow you to focus on caring for and enjoying your new baby. You may also want to have a tour of your hospital's labor and delivery unit to get a better idea of what to expect while you are in the hospital. 
During these last months, it is very important to take good care of yourself and pay attention to what your body needs. If your doctor says it is okay for you to work, don't push yourself too hard. Use the tips provided in this care sheet to ease heartburn and care for varicose veins. If you haven't already had the Tdap shot during this pregnancy, talk to your doctor about getting it. It will help protect your  against pertussis infection. Follow-up care is a key part of your treatment and safety. Be sure to make and go to all appointments, and call your doctor if you are having problems. It's also a good idea to know your test results and keep a list of the medicines you take. How can you care for yourself at home? Pay attention to your baby's movements · You should feel your baby move several times every day. · Your baby now turns less, and kicks and jabs more. · Your baby sleeps 20 to 45 minutes at a time and is more active at certain times of day. · If your doctor wants you to count your baby's kicks: 
¨ Empty your bladder, and lie on your side or relax in a comfortable chair. ¨ Write down your start time. ¨ Pay attention only to your baby's movements. Count any movement except hiccups. ¨ After you have counted 10 movements, write down your stop time. ¨ Write down how many minutes it took for your baby to move 10 times. ¨ If an hour goes by and you have not recorded 10 movements, have something to eat or drink and then count for another hour. If you do not record 10 movements in either hour, call your doctor. Ease heartburn · Eat small, frequent meals. · Do not eat chocolate, peppermint, or very spicy foods. Avoid drinks with caffeine, such as coffee, tea, and sodas. · Avoid bending over or lying down after meals. · Talk a short walk after you eat. · If heartburn is a problem at night, do not eat for 2 hours before bedtime. · Take antacids like Mylanta, Maalox, Rolaids, or Tums. Do not take antacids that have sodium bicarbonate. Care for varicose veins · Varicose veins are blood vessels that stretch out with the extra blood during pregnancy. Your legs may ache or throb. Most varicose veins will go away after the birth. · Avoid standing for long periods of time. Sit with your legs crossed at the ankles, not the knees. · Sit with your feet propped up. · Avoid tight clothing or stockings. Wear support hose. · Exercise regularly. Try walking for at least 30 minutes a day. Where can you learn more? Go to http://bebo-jigna.info/. Enter T464 in the search box to learn more about \"Weeks 30 to 32 of Your Pregnancy: Care Instructions. \" Current as of: March 16, 2017 Content Version: 11.4 © 3940-6811 Polaris Wireless. Care instructions adapted under license by Transaq (which disclaims liability or warranty for this information). If you have questions about a medical condition or this instruction, always ask your healthcare professional. [place] [address] any warranty or liability for your use of this information. Introducing [facility] & HEALTH SERVICES! Dear Maureen Arceo: Thank you for requesting a MJJ Sales account.   Our records indicate that you already have an active Bastion Security Installations account. You can access your account anytime at [email or web address] Did you know that you can access your hospital and ER discharge instructions at any time in [employer]? You can also review all of your test results from your hospital stay or ER visit. Additional Information If you have questions, please visit the Frequently Asked Questions section of the [employer] website at [email or web address] Remember, [employer] is NOT to be used for urgent needs. For medical emergencies, dial 911. Now available from your iPhone and Android! Please provide this summary of care documentation to your next provider. Your primary care clinician is listed as Jama Braden. If you have any questions after today's visit, please call [phone].

## 2017-11-06 NOTE — PATIENT INSTRUCTIONS
Weeks 30 to 32 of Your Pregnancy: Care Instructions  Your Care Instructions    You have made it to the final months of your pregnancy. By now, your baby is really starting to look like a baby, with hair and plump skin. As you enter the final weeks of pregnancy, the reality of having a baby may start to set in. This is the time to settle on a name, get your household in order, set up a safe nursery, and find quality  if needed. Doing these things in advance will allow you to focus on caring for and enjoying your new baby. You may also want to have a tour of your hospital's labor and delivery unit to get a better idea of what to expect while you are in the hospital.  During these last months, it is very important to take good care of yourself and pay attention to what your body needs. If your doctor says it is okay for you to work, don't push yourself too hard. Use the tips provided in this care sheet to ease heartburn and care for varicose veins. If you haven't already had the Tdap shot during this pregnancy, talk to your doctor about getting it. It will help protect your  against pertussis infection. Follow-up care is a key part of your treatment and safety. Be sure to make and go to all appointments, and call your doctor if you are having problems. It's also a good idea to know your test results and keep a list of the medicines you take. How can you care for yourself at home? Pay attention to your baby's movements  · You should feel your baby move several times every day. · Your baby now turns less, and kicks and jabs more. · Your baby sleeps 20 to 45 minutes at a time and is more active at certain times of day. · If your doctor wants you to count your baby's kicks:  ¨ Empty your bladder, and lie on your side or relax in a comfortable chair. ¨ Write down your start time. ¨ Pay attention only to your baby's movements. Count any movement except hiccups.   ¨ After you have counted 10 movements, write down your stop time. ¨ Write down how many minutes it took for your baby to move 10 times. ¨ If an hour goes by and you have not recorded 10 movements, have something to eat or drink and then count for another hour. If you do not record 10 movements in either hour, call your doctor. Ease heartburn  · Eat small, frequent meals. · Do not eat chocolate, peppermint, or very spicy foods. Avoid drinks with caffeine, such as coffee, tea, and sodas. · Avoid bending over or lying down after meals. · Talk a short walk after you eat. · If heartburn is a problem at night, do not eat for 2 hours before bedtime. · Take antacids like Mylanta, Maalox, Rolaids, or Tums. Do not take antacids that have sodium bicarbonate. Care for varicose veins  · Varicose veins are blood vessels that stretch out with the extra blood during pregnancy. Your legs may ache or throb. Most varicose veins will go away after the birth. · Avoid standing for long periods of time. Sit with your legs crossed at the ankles, not the knees. · Sit with your feet propped up. · Avoid tight clothing or stockings. Wear support hose. · Exercise regularly. Try walking for at least 30 minutes a day. Where can you learn more? Go to http://bebo-jigna.info/. Enter W834 in the search box to learn more about \"Weeks 30 to 32 of Your Pregnancy: Care Instructions. \"  Current as of: March 16, 2017  Content Version: 11.4  © 9590-3508 [employer]. Care instructions adapted under license by [employer] (which disclaims liability or warranty for this information). If you have questions about a medical condition or this instruction, always ask your healthcare professional. [place] [address] any warranty or liability for your use of this information.

## 2017-11-20 ENCOUNTER — ROUTINE PRENATAL (OUTPATIENT)
Dept: OBGYN CLINIC | Age: 27
End: 2017-11-20

## 2017-11-20 VITALS — SYSTOLIC BLOOD PRESSURE: 114 MMHG | DIASTOLIC BLOOD PRESSURE: 64 MMHG | WEIGHT: 187.4 LBS | BODY MASS INDEX: 35.41 KG/M2

## 2017-11-20 DIAGNOSIS — R76.0 ABNORMAL ANTIBODY TITER: ICD-10-CM

## 2017-11-20 DIAGNOSIS — Z34.83 ENCOUNTER FOR SUPERVISION OF OTHER NORMAL PREGNANCY, THIRD TRIMESTER: Primary | ICD-10-CM

## 2017-11-20 NOTE — PROGRESS NOTES
Problem List  Date Reviewed: 11/6/2017          Codes Class Noted    Pregnancy ICD-10-CM: Z34.90  ICD-9-CM: V22.2  10/6/2017        Encounter for supervision of other normal pregnancy, first trimester ICD-10-CM: Z34.81  ICD-9-CM: V22.1  6/6/2017    Overview Addendum 10/30/2017  2:11 PM by Vidya OVALLES Silviano     Intrauterine pregnancy with the following problems identified:   EDC 1/9/2018 by D=US  Pp hemorrhage after G1 - transfusion  Short cervix with G2 - no treatment, followed by MFM. Induced at 39 weeks  Pos Anit-S - check titer q4-6 weeks - MFM at 1:16  Further red cell antigen testing.  See MFM - rec crossmatch blood prior to delivery  Repeat antibody screen in 4 weeks-September; repeat antibody screen around 32 weeks  Flu vaccine today  Anemia @ 29wks- recheck antibodies in 4 wks             Pulmonary HTN ICD-10-CM: I27.20  ICD-9-CM: 416.8  5/6/2015

## 2017-11-27 DIAGNOSIS — Z34.81 ENCOUNTER FOR SUPERVISION OF OTHER NORMAL PREGNANCY, FIRST TRIMESTER: ICD-10-CM

## 2017-12-06 ENCOUNTER — ROUTINE PRENATAL (OUTPATIENT)
Dept: OBGYN CLINIC | Age: 27
End: 2017-12-06

## 2017-12-06 VITALS — SYSTOLIC BLOOD PRESSURE: 120 MMHG | DIASTOLIC BLOOD PRESSURE: 70 MMHG | WEIGHT: 189 LBS | BODY MASS INDEX: 35.71 KG/M2

## 2017-12-06 DIAGNOSIS — Z34.83 ENCOUNTER FOR SUPERVISION OF OTHER NORMAL PREGNANCY, THIRD TRIMESTER: Primary | ICD-10-CM

## 2017-12-06 DIAGNOSIS — Z34.81 ENCOUNTER FOR SUPERVISION OF OTHER NORMAL PREGNANCY, FIRST TRIMESTER: ICD-10-CM

## 2017-12-06 DIAGNOSIS — R76.0 ABNORMAL ANTIBODY TITER: ICD-10-CM

## 2017-12-06 LAB — GRBS, EXTERNAL: NEGATIVE

## 2017-12-06 NOTE — PROGRESS NOTES
LIMITED OB SCAN  A SINGLE VERTEX 35W0D IUP IS SEEN. FETAL CARDIAC MOTION OBSERVED. LIMITED ANATOMY WAS VISUALIZED AND APPEARS WNL. APPROPRIATE FETAL GROWTH IS SEEN. SIZE = DATES. EVI AND PLACENTA APPEAR WNL. Problem List  Date Reviewed: 11/20/2017          Codes Class Noted    Pregnancy ICD-10-CM: Z34.90  ICD-9-CM: V22.2  10/6/2017        Encounter for supervision of other normal pregnancy, first trimester ICD-10-CM: Z34.81  ICD-9-CM: V22.1  6/6/2017    Overview Addendum 11/27/2017  8:51 AM by Amanda Us LPN     Intrauterine pregnancy with the following problems identified:   EDC 1/9/2018 by D=US  Pp hemorrhage after G1 - transfusion  Short cervix with G2 - no treatment, followed by MFM. Induced at 39 weeks  Pos Anit-S - check titer q4-6 weeks - MFM at 1:16  Further red cell antigen testing.  See MFM - rec crossmatch blood prior to delivery  Repeat antibody screen in 4 weeks-September; repeat antibody screen around 32 weeks  Flu vaccine today  Anemia @ 29wks- recheck antibodies in 4 wks  Antibody screen positive-A             Pulmonary HTN ICD-10-CM: I27.20  ICD-9-CM: 416.8  5/6/2015

## 2017-12-06 NOTE — PROGRESS NOTES
US yesterday 69%tile - tested to about 9#. GBS today.  Needs ab screen in hospital  Disc labor prec  Consider induction 39 weeks so blood can be set up night prior

## 2017-12-06 NOTE — PATIENT INSTRUCTIONS

## 2017-12-08 LAB — GP B STREP DNA SPEC QL NAA+PROBE: NEGATIVE

## 2017-12-13 ENCOUNTER — HOSPITAL ENCOUNTER (EMERGENCY)
Age: 27
Discharge: HOME | End: 2017-12-13
Attending: OBSTETRICS & GYNECOLOGY | Admitting: OBSTETRICS & GYNECOLOGY
Payer: MEDICAID

## 2017-12-13 ENCOUNTER — ROUTINE PRENATAL (OUTPATIENT)
Dept: OBGYN CLINIC | Age: 27
End: 2017-12-13

## 2017-12-13 VITALS — SYSTOLIC BLOOD PRESSURE: 122 MMHG | DIASTOLIC BLOOD PRESSURE: 70 MMHG | WEIGHT: 189 LBS | BODY MASS INDEX: 35.71 KG/M2

## 2017-12-13 DIAGNOSIS — Z34.83 ENCOUNTER FOR SUPERVISION OF OTHER NORMAL PREGNANCY, THIRD TRIMESTER: Primary | ICD-10-CM

## 2017-12-13 DIAGNOSIS — R76.0 ABNORMAL ANTIBODY TITER: ICD-10-CM

## 2017-12-13 LAB
ABO + RH BLD: NORMAL
ANTIGENS PRESENT BLD: NORMAL
BLOOD BANK CMNT PATIENT-IMP: NORMAL
BLOOD GROUP ANTIBODIES SERPL: NORMAL
BLOOD GROUP ANTIBODIES SERPL: NORMAL
SPECIMEN EXP DATE BLD: NORMAL

## 2017-12-13 PROCEDURE — 86905 BLOOD TYPING RBC ANTIGENS: CPT | Performed by: OBSTETRICS & GYNECOLOGY

## 2017-12-13 PROCEDURE — 86900 BLOOD TYPING SEROLOGIC ABO: CPT | Performed by: OBSTETRICS & GYNECOLOGY

## 2017-12-13 PROCEDURE — 36415 COLL VENOUS BLD VENIPUNCTURE: CPT | Performed by: OBSTETRICS & GYNECOLOGY

## 2017-12-13 PROCEDURE — 86870 RBC ANTIBODY IDENTIFICATION: CPT | Performed by: OBSTETRICS & GYNECOLOGY

## 2017-12-13 NOTE — PATIENT INSTRUCTIONS

## 2017-12-13 NOTE — PROGRESS NOTES
Baby moving.  GBS neg    Will get ab screen today in hospital. Clarice Castillo for 1/3 - can get screen and blood set up on 1/2

## 2017-12-13 NOTE — PROGRESS NOTES
Problem List  Date Reviewed: 12/6/2017          Codes Class Noted    Pregnancy ICD-10-CM: Z34.90  ICD-9-CM: V22.2  10/6/2017        Encounter for supervision of other normal pregnancy, first trimester ICD-10-CM: Z34.81  ICD-9-CM: V22.1  6/6/2017    Overview Addendum 12/6/2017  4:16 PM by Olu Ramirez MD     Intrauterine pregnancy with the following problems identified:   EDC 1/9/2018 by D=US  Pp hemorrhage after G1 - transfusion  Short cervix with G2 - no treatment, followed by MFM. Induced at 39 weeks  Pos Anit-S - check titer q4-6 weeks - MFM at 1:16  Further red cell antigen testing.  See MFM - rec crossmatch blood prior to delivery  Repeat antibody screen in 4 weeks-September; repeat antibody screen around 32 weeks  Flu vaccine today  Anemia @ 29wks- recheck antibodies in 4 wks  Antibody screen positive - needs screen in hospital to ID             Pulmonary HTN ICD-10-CM: I27.20  ICD-9-CM: 416.8  5/6/2015

## 2017-12-18 ENCOUNTER — ROUTINE PRENATAL (OUTPATIENT)
Dept: OBGYN CLINIC | Age: 27
End: 2017-12-18

## 2017-12-18 ENCOUNTER — HOSPITAL ENCOUNTER (EMERGENCY)
Age: 27
Discharge: HOME | End: 2017-12-18
Attending: OBSTETRICS & GYNECOLOGY | Admitting: OBSTETRICS & GYNECOLOGY

## 2017-12-18 ENCOUNTER — TELEPHONE (OUTPATIENT)
Dept: OBGYN CLINIC | Age: 27
End: 2017-12-18

## 2017-12-18 VITALS — DIASTOLIC BLOOD PRESSURE: 68 MMHG | BODY MASS INDEX: 36.3 KG/M2 | WEIGHT: 192.13 LBS | SYSTOLIC BLOOD PRESSURE: 122 MMHG

## 2017-12-18 DIAGNOSIS — R76.0 ABNORMAL ANTIBODY TITER: ICD-10-CM

## 2017-12-18 DIAGNOSIS — Z34.80 ENCOUNTER FOR SUPERVISION OF OTHER NORMAL PREGNANCY, UNSPECIFIED TRIMESTER: Primary | ICD-10-CM

## 2017-12-18 NOTE — TELEPHONE ENCOUNTER
Called and scheduled patient for IOL on 1/3/18 for abnormal antibosy screen. Pt notified and given instructions.

## 2017-12-18 NOTE — PROGRESS NOTES
Problem List  Date Reviewed: 12/13/2017          Codes Class Noted    Pregnancy ICD-10-CM: Z34.90  ICD-9-CM: V22.2  10/6/2017        Encounter for supervision of other normal pregnancy, first trimester ICD-10-CM: Z34.81  ICD-9-CM: V22.1  6/6/2017    Overview Addendum 12/14/2017  3:26 PM by Vidya OVALLES Silviano     Intrauterine pregnancy with the following problems identified:   EDC 1/9/2018 by D=US  Pp hemorrhage after G1 - transfusion  Short cervix with G2 - no treatment, followed by MFM. Induced at 39 weeks  Pos Anit-S - check titer q4-6 weeks - MFM at 1:16  Further red cell antigen testing. See MFM - rec crossmatch blood prior to delivery  Repeat antibody screen in 4 weeks-September; repeat antibody screen around 32 weeks  Flu vaccine today  Anemia @ 29wks- recheck antibodies in 4 wks  Antibody screen positive - needs screen in hospital to ID  Antic-c antibody.  Will repeat weekly same way             Pulmonary HTN ICD-10-CM: I27.20  ICD-9-CM: 416.8  5/6/2015

## 2017-12-20 ENCOUNTER — TELEPHONE (OUTPATIENT)
Dept: OBGYN CLINIC | Age: 27
End: 2017-12-20

## 2017-12-20 NOTE — TELEPHONE ENCOUNTER
Patient aware of induction date. Advised to speak with TH at her next appt to determine her arrival time. Patient verbalized understanding.

## 2017-12-20 NOTE — TELEPHONE ENCOUNTER
Patient calling stating that she would like to have her induction scheduled for January 4, 2018. Please call patient if this can be done on this day.

## 2017-12-20 NOTE — TELEPHONE ENCOUNTER
Shilpa Velazquez 3 hours ago (10:23 AM)                 Patient scheduled for 1/4/18 @ 6:30am (Routing comment)

## 2017-12-21 ENCOUNTER — TELEPHONE (OUTPATIENT)
Dept: OBGYN CLINIC | Age: 27
End: 2017-12-21

## 2017-12-21 NOTE — TELEPHONE ENCOUNTER
Pt having antibody screen done weekly from L+D. Per Dr. Rc Hutson I need to call and add on antibody screen because it was not run. Per the lab, her specimen was thrown out from 12/18/17 because \"she was listed as discharged\". She would have to come back in and be redrawn.

## 2017-12-26 ENCOUNTER — HOSPITAL ENCOUNTER (EMERGENCY)
Age: 27
Discharge: HOME | End: 2017-12-26
Attending: OBSTETRICS & GYNECOLOGY | Admitting: OBSTETRICS & GYNECOLOGY
Payer: MEDICAID

## 2017-12-26 ENCOUNTER — ROUTINE PRENATAL (OUTPATIENT)
Dept: OBGYN CLINIC | Age: 27
End: 2017-12-26

## 2017-12-26 VITALS — WEIGHT: 195.8 LBS | DIASTOLIC BLOOD PRESSURE: 68 MMHG | SYSTOLIC BLOOD PRESSURE: 118 MMHG | BODY MASS INDEX: 37 KG/M2

## 2017-12-26 DIAGNOSIS — Z34.80 ENCOUNTER FOR SUPERVISION OF OTHER NORMAL PREGNANCY, UNSPECIFIED TRIMESTER: Primary | ICD-10-CM

## 2017-12-26 DIAGNOSIS — R76.0 ABNORMAL ANTIBODY TITER: ICD-10-CM

## 2017-12-26 PROCEDURE — 99281 EMR DPT VST MAYX REQ PHY/QHP: CPT

## 2017-12-26 PROCEDURE — 86900 BLOOD TYPING SEROLOGIC ABO: CPT | Performed by: OBSTETRICS & GYNECOLOGY

## 2017-12-26 PROCEDURE — 86870 RBC ANTIBODY IDENTIFICATION: CPT | Performed by: OBSTETRICS & GYNECOLOGY

## 2017-12-26 PROCEDURE — 36415 COLL VENOUS BLD VENIPUNCTURE: CPT | Performed by: OBSTETRICS & GYNECOLOGY

## 2017-12-26 NOTE — PROGRESS NOTES
Problem List  Date Reviewed: 12/18/2017          Codes Class Noted    Pregnancy ICD-10-CM: Z34.90  ICD-9-CM: V22.2  10/6/2017        Encounter for supervision of other normal pregnancy, first trimester ICD-10-CM: Z34.81  ICD-9-CM: V22.1  6/6/2017    Overview Addendum 12/20/2017 10:27 AM by Errol Yepez     Intrauterine pregnancy with the following problems identified:   EDC 1/9/2018 by D=US  Pp hemorrhage after G1 - transfusion  Short cervix with G2 - no treatment, followed by MFM. Induced at 39 weeks  Pos Anit-S - check titer q4-6 weeks - MFM at 1:16  Further red cell antigen testing. See MFM - rec crossmatch blood prior to delivery  Repeat antibody screen in 4 weeks-September; repeat antibody screen around 32 weeks  Flu vaccine today  Anemia @ 29wks- recheck antibodies in 4 wks  Antibody screen positive - needs screen in hospital to ID  Antic-c antibody. Will repeat weekly same way  Induction 1/4/18. Pt notified.              Pulmonary HTN ICD-10-CM: I27.20  ICD-9-CM: 416.8  5/6/2015

## 2017-12-26 NOTE — PROGRESS NOTES
1625: Labs drawn via venipuncture. Patient tolerated well.     1629: Patient ambulated off the unit.

## 2017-12-26 NOTE — PATIENT INSTRUCTIONS
Week 38 of Your Pregnancy: Care Instructions  Your Care Instructions    Believe it or not, your baby is almost here. You may have ideas about your baby's personality because of how much he or she moves. Or you may have noticed how he or she responds to sounds, warmth, cold, and light. You may even know what kind of music your baby likes. By now, you have a better idea of what to expect during delivery. You may have talked about your birth preferences with your doctor. But even if you want a vaginal birth, it is a good idea to learn about  births.  birth means that your baby is born through a cut (incision) in your lower belly. It is sometimes the best choice for the health of the baby and the mother. This care sheet can help you understand  births. It also gives you information about what to expect after your baby is born. And it helps you understand more about postpartum depression. Follow-up care is a key part of your treatment and safety. Be sure to make and go to all appointments, and call your doctor if you are having problems. It's also a good idea to know your test results and keep a list of the medicines you take. How can you care for yourself at home? Learn about  birth  · Most C-sections are unplanned. They are done because of problems that occur during labor. These problems might include:  ¨ Labor that slows or stops. ¨ High blood pressure or other problems for the mother. ¨ Signs of distress in the baby. These signs may include a very fast or slow heart rate. · Although most mothers and babies do well after , it is major surgery. It has more risks than a vaginal delivery. · In some cases, a planned  may be safer than a vaginal delivery. This may be the case if:  ¨ The mother has a health problem, such as a heart condition. ¨ The baby isn't in a head-down position for delivery. This is called a breech position.   ¨ The uterus has scars from past surgeries. This could increase the chance of a tear in the uterus. ¨ There is a problem with the placenta. ¨ The mother has an infection, such as genital herpes, that could be spread to the baby. ¨ The mother is having twins or more. ¨ The baby weighs 9 to 10 pounds or more. · Because of the risks of , planned C-sections generally should be done only for medical reasons. And a planned  should be done at 39 weeks or later unless there is a medical reason to do it sooner. Know what to expect after delivery, and plan for the first few weeks at home  · You, your baby, and your partner or  will get identification bands. Only people with matching bands can  the baby from the nursery. · You will learn how to feed, diaper, and bathe your baby. And you will learn how to care for the umbilical cord stump. If your baby will be circumcised, you will also learn how to care for that. · Ask people to wait to visit you until you are at home. And ask them to wash their hands before they touch your baby. · Make sure you have another adult in your home for at least 2 or 3 days after the birth. · During the first 2 weeks, limit when friends and family can visit. · Do not allow visitors who have colds or infections. Make sure all visitors are up to date with their vaccinations. Never let anyone smoke around your baby. · Try to nap when the baby naps. Be aware of postpartum depression  · \"Baby blues\" are common for the first 1 to 2 weeks after birth. You may cry or feel sad or irritable for no reason. · For some women, these feelings last longer and are more intense. This is called postpartum depression. · If your symptoms last for more than a few weeks or you feel very depressed, ask your doctor for help. · Postpartum depression can be treated. Support groups and counseling can help. Sometimes medicine can also help. Where can you learn more?   Go to http://bebo-jgina.info/. Enter B044 in the search box to learn more about \"Week 38 of Your Pregnancy: Care Instructions. \"  Current as of: March 16, 2017  Content Version: 11.4  © 3733-2493 [employer]. Care instructions adapted under license by [employer] (which disclaims liability or warranty for this information). If you have questions about a medical condition or this instruction, always ask your healthcare professional. [address] any warranty or liability for your use of this information.

## 2017-12-27 LAB
ABO + RH BLD: NORMAL
BLOOD BANK CMNT PATIENT-IMP: NORMAL
BLOOD GROUP ANTIBODIES SERPL: NORMAL
BLOOD GROUP ANTIBODIES SERPL: NORMAL
SPECIMEN EXP DATE BLD: NORMAL

## 2018-01-02 ENCOUNTER — ROUTINE PRENATAL (OUTPATIENT)
Dept: OBGYN CLINIC | Age: 28
End: 2018-01-02

## 2018-01-02 ENCOUNTER — HOSPITAL ENCOUNTER (EMERGENCY)
Age: 28
Discharge: HOME | End: 2018-01-02
Attending: OBSTETRICS & GYNECOLOGY | Admitting: OBSTETRICS & GYNECOLOGY
Payer: MEDICAID

## 2018-01-02 VITALS — WEIGHT: 192.8 LBS | BODY MASS INDEX: 36.43 KG/M2 | DIASTOLIC BLOOD PRESSURE: 72 MMHG | SYSTOLIC BLOOD PRESSURE: 120 MMHG

## 2018-01-02 DIAGNOSIS — Z34.80 ENCOUNTER FOR SUPERVISION OF OTHER NORMAL PREGNANCY, UNSPECIFIED TRIMESTER: Primary | ICD-10-CM

## 2018-01-02 DIAGNOSIS — R76.0 ABNORMAL ANTIBODY TITER: ICD-10-CM

## 2018-01-02 PROCEDURE — 86870 RBC ANTIBODY IDENTIFICATION: CPT | Performed by: OBSTETRICS & GYNECOLOGY

## 2018-01-02 PROCEDURE — 36415 COLL VENOUS BLD VENIPUNCTURE: CPT | Performed by: OBSTETRICS & GYNECOLOGY

## 2018-01-02 PROCEDURE — 86902 BLOOD TYPE ANTIGEN DONOR EA: CPT | Performed by: OBSTETRICS & GYNECOLOGY

## 2018-01-02 PROCEDURE — 86900 BLOOD TYPING SEROLOGIC ABO: CPT | Performed by: OBSTETRICS & GYNECOLOGY

## 2018-01-02 PROCEDURE — 86920 COMPATIBILITY TEST SPIN: CPT | Performed by: OBSTETRICS & GYNECOLOGY

## 2018-01-02 PROCEDURE — 86644 CMV ANTIBODY: CPT | Performed by: OBSTETRICS & GYNECOLOGY

## 2018-01-02 PROCEDURE — 99281 EMR DPT VST MAYX REQ PHY/QHP: CPT

## 2018-01-02 PROCEDURE — 86922 COMPATIBILITY TEST ANTIGLOB: CPT | Performed by: OBSTETRICS & GYNECOLOGY

## 2018-01-02 PROCEDURE — 86921 COMPATIBILITY TEST INCUBATE: CPT | Performed by: OBSTETRICS & GYNECOLOGY

## 2018-01-02 NOTE — PROGRESS NOTES
1591: pt arrived to L&D unit for lab draw. Blood bank called and informed of pt being here and needing two units on hold for Thursday 1/4/18. Arland Castleman in blood bank said nurse just needed a type and screen order in for today and 2 units will be put on hold for pt for this Thursday. 0920: pt blood drawn by DIXON Aceves (ob [facility]) with Josephine Crenshaw Rn at side; pt tolerated well.  6119: pt left unit ambulatory in stable condition.

## 2018-01-02 NOTE — PROGRESS NOTES
Problem List  Date Reviewed: 12/26/2017          Codes Class Noted    Pregnancy ICD-10-CM: Z34.90  ICD-9-CM: V22.2  10/6/2017        Encounter for supervision of other normal pregnancy, first trimester ICD-10-CM: Z34.81  ICD-9-CM: V22.1  6/6/2017    Overview Addendum 12/20/2017 10:27 AM by Lisbeth Sanz     Intrauterine pregnancy with the following problems identified:   EDC 1/9/2018 by D=US  Pp hemorrhage after G1 - transfusion  Short cervix with G2 - no treatment, followed by MFM. Induced at 39 weeks  Pos Anit-S - check titer q4-6 weeks - MFM at 1:16  Further red cell antigen testing. See MFM - rec crossmatch blood prior to delivery  Repeat antibody screen in 4 weeks-September; repeat antibody screen around 32 weeks  Flu vaccine today  Anemia @ 29wks- recheck antibodies in 4 wks  Antibody screen positive - needs screen in hospital to ID  Antic-c antibody. Will repeat weekly same way  Induction 1/4/18. Pt notified.              Pulmonary HTN ICD-10-CM: I27.20  ICD-9-CM: 416.8  5/6/2015

## 2018-01-02 NOTE — PATIENT INSTRUCTIONS
Week 39 of Your Pregnancy: Care Instructions  Your Care Instructions    During these final weeks, you may feel anxious to see your new baby. [place] babies often look different from what you see in pictures or movies. Right after birth, their heads may have a strange shape. Their eyes may be puffy. And their genitals may be swollen. They may also have very dry skin, or red marks on the eyelids, nose, or neck. Still, most parents think their babies are beautiful. Follow-up care is a key part of your treatment and safety. Be sure to make and go to all appointments, and call your doctor if you are having problems. It's also a good idea to know your test results and keep a list of the medicines you take. How can you care for yourself at home? Prepare to breastfeed  · If you are breastfeeding, continue to eat healthy foods. · Avoid alcohol, cigarettes, and drugs. This includes prescription and over-the-counter medicines. · You can help prevent sore nipples if you feed your baby in the correct position. Nurses will help you learn to do this. · Your  will need to be fed about every 1½ to 3 hours. Choose the right birth control after your baby is born  · Women who are breastfeeding can still get pregnant. Use birth control if you don't want to get pregnant. · Intrauterine devices (IUDs) work for women who want to wait at least 2 years before getting pregnant again. They are safe to use while you are breastfeeding. · Depo-Provera can be used while you are breastfeeding. It is a shot you get every 3 months. · Birth control pills work well. But you need a different kind of pill while you are breastfeeding. And when you start taking these pills, you need to make sure to use another type of birth control until you start your second pack. · Diaphragms, cervical caps, tubal implants, and condoms with spermicide work less well after birth.  If you have a diaphragm or cervical cap, you will need to have it refitted. · Tubal ligation (tying your tubes) and vasectomy are both permanent. These are good options if you are sure you are done having children. Where can you learn more? Go to http://bebo-jigna.info/. Enter F206 in the search box to learn more about \"Week 39 of Your Pregnancy: Care Instructions. \"  Current as of: March 16, 2017  Content Version: 11.4  © 2629-1462 [employer]. Care instructions adapted under license by [employer] (which disclaims liability or warranty for this information). If you have questions about a medical condition or this instruction, always ask your healthcare professional. [address] any warranty or liability for your use of this information.

## 2018-01-04 ENCOUNTER — ANESTHESIA EVENT (OUTPATIENT)
Dept: LABOR AND DELIVERY | Age: 28
DRG: 560 | End: 2018-01-04
Payer: MEDICAID

## 2018-01-04 ENCOUNTER — HOSPITAL ENCOUNTER (INPATIENT)
Age: 28
LOS: 2 days | Discharge: HOME | DRG: 560 | End: 2018-01-06
Attending: OBSTETRICS & GYNECOLOGY | Admitting: OBSTETRICS & GYNECOLOGY
Payer: MEDICAID

## 2018-01-04 ENCOUNTER — ANESTHESIA (OUTPATIENT)
Dept: LABOR AND DELIVERY | Age: 28
DRG: 560 | End: 2018-01-04
Payer: MEDICAID

## 2018-01-04 LAB
ABO + RH BLD: NORMAL
ANTIGENS PRESENT RBC DONR: NORMAL
ANTIGENS PRESENT RBC DONR: NORMAL
BASOPHILS # BLD: 0 K/UL (ref 0–0.1)
BASOPHILS NFR BLD: 0 % (ref 0–1)
BLD PROD TYP BPU: NORMAL
BLD PROD TYP BPU: NORMAL
BLOOD BANK CMNT PATIENT-IMP: NORMAL
BLOOD GROUP ANTIBODIES SERPL: NORMAL
BLOOD GROUP ANTIBODIES SERPL: NORMAL
BPU ID: NORMAL
BPU ID: NORMAL
CROSSMATCH RESULT,%XM: NORMAL
CROSSMATCH RESULT,%XM: NORMAL
EOSINOPHIL # BLD: 0.1 K/UL (ref 0–0.4)
EOSINOPHIL NFR BLD: 1 % (ref 0–7)
ERYTHROCYTE [DISTWIDTH] IN BLOOD BY AUTOMATED COUNT: 15.7 % (ref 11.5–14.5)
HCT VFR BLD AUTO: 29.9 % (ref 35–47)
HGB BLD-MCNC: 9.4 G/DL (ref 11.5–16)
LYMPHOCYTES # BLD: 3 K/UL (ref 0.8–3.5)
LYMPHOCYTES NFR BLD: 25 % (ref 12–49)
MCH RBC QN AUTO: 23.7 PG (ref 26–34)
MCHC RBC AUTO-ENTMCNC: 31.4 G/DL (ref 30–36.5)
MCV RBC AUTO: 75.3 FL (ref 80–99)
MONOCYTES # BLD: 0.8 K/UL (ref 0–1)
MONOCYTES NFR BLD: 7 % (ref 5–13)
NEUTS SEG # BLD: 7.9 K/UL (ref 1.8–8)
NEUTS SEG NFR BLD: 67 % (ref 32–75)
PLATELET # BLD AUTO: 254 K/UL (ref 150–400)
RBC # BLD AUTO: 3.97 M/UL (ref 3.8–5.2)
RBC MORPH BLD: ABNORMAL
SPECIMEN EXP DATE BLD: NORMAL
STATUS OF UNIT,%ST: NORMAL
STATUS OF UNIT,%ST: NORMAL
UNIT DIVISION, %UDIV: 0
UNIT DIVISION, %UDIV: 0
WBC # BLD AUTO: 11.8 K/UL (ref 3.6–11)

## 2018-01-04 PROCEDURE — 75410000002 HC LABOR FEE PER 1 HR: Performed by: OBSTETRICS & GYNECOLOGY

## 2018-01-04 PROCEDURE — 65270000029 HC RM PRIVATE

## 2018-01-04 PROCEDURE — 36415 COLL VENOUS BLD VENIPUNCTURE: CPT | Performed by: OBSTETRICS & GYNECOLOGY

## 2018-01-04 PROCEDURE — 74011250636 HC RX REV CODE- 250/636: Performed by: OBSTETRICS & GYNECOLOGY

## 2018-01-04 PROCEDURE — 3E0R3BZ INTRODUCTION OF ANESTHETIC AGENT INTO SPINAL CANAL, PERCUTANEOUS APPROACH: ICD-10-PCS | Performed by: ANESTHESIOLOGY

## 2018-01-04 PROCEDURE — 86902 BLOOD TYPE ANTIGEN DONOR EA: CPT | Performed by: OBSTETRICS & GYNECOLOGY

## 2018-01-04 PROCEDURE — 86870 RBC ANTIBODY IDENTIFICATION: CPT | Performed by: OBSTETRICS & GYNECOLOGY

## 2018-01-04 PROCEDURE — 75410000000 HC DELIVERY VAGINAL/SINGLE: Performed by: OBSTETRICS & GYNECOLOGY

## 2018-01-04 PROCEDURE — 77010026065 HC OXYGEN MINIMUM MEDICAL AIR: Performed by: OBSTETRICS & GYNECOLOGY

## 2018-01-04 PROCEDURE — 74011250636 HC RX REV CODE- 250/636: Performed by: NURSE ANESTHETIST, CERTIFIED REGISTERED

## 2018-01-04 PROCEDURE — 77030014125 HC TY EPDRL BBMI -B: Performed by: NURSE ANESTHETIST, CERTIFIED REGISTERED

## 2018-01-04 PROCEDURE — 77030010848 HC CATH INTUTR PRSS KOLB -B

## 2018-01-04 PROCEDURE — 86920 COMPATIBILITY TEST SPIN: CPT | Performed by: OBSTETRICS & GYNECOLOGY

## 2018-01-04 PROCEDURE — 74011250637 HC RX REV CODE- 250/637: Performed by: OBSTETRICS & GYNECOLOGY

## 2018-01-04 PROCEDURE — 77030018749 HC HK AMNIO DISP DERY -A

## 2018-01-04 PROCEDURE — 75410000003 HC RECOV DEL/VAG/CSECN EA 0.5 HR: Performed by: OBSTETRICS & GYNECOLOGY

## 2018-01-04 PROCEDURE — 10907ZC DRAINAGE OF AMNIOTIC FLUID, THERAPEUTIC FROM PRODUCTS OF CONCEPTION, VIA NATURAL OR ARTIFICIAL OPENING: ICD-10-PCS | Performed by: OBSTETRICS & GYNECOLOGY

## 2018-01-04 PROCEDURE — 86922 COMPATIBILITY TEST ANTIGLOB: CPT | Performed by: OBSTETRICS & GYNECOLOGY

## 2018-01-04 PROCEDURE — 00HU33Z INSERTION OF INFUSION DEVICE INTO SPINAL CANAL, PERCUTANEOUS APPROACH: ICD-10-PCS | Performed by: ANESTHESIOLOGY

## 2018-01-04 PROCEDURE — 0KQM0ZZ REPAIR PERINEUM MUSCLE, OPEN APPROACH: ICD-10-PCS | Performed by: OBSTETRICS & GYNECOLOGY

## 2018-01-04 PROCEDURE — 85025 COMPLETE CBC W/AUTO DIFF WBC: CPT | Performed by: OBSTETRICS & GYNECOLOGY

## 2018-01-04 PROCEDURE — 86900 BLOOD TYPING SEROLOGIC ABO: CPT | Performed by: OBSTETRICS & GYNECOLOGY

## 2018-01-04 PROCEDURE — 86921 COMPATIBILITY TEST INCUBATE: CPT | Performed by: OBSTETRICS & GYNECOLOGY

## 2018-01-04 PROCEDURE — 76060000078 HC EPIDURAL ANESTHESIA: Performed by: NURSE ANESTHETIST, CERTIFIED REGISTERED

## 2018-01-04 PROCEDURE — 74011000250 HC RX REV CODE- 250

## 2018-01-04 PROCEDURE — 86644 CMV ANTIBODY: CPT | Performed by: OBSTETRICS & GYNECOLOGY

## 2018-01-04 PROCEDURE — 77030034850

## 2018-01-04 PROCEDURE — 74011250636 HC RX REV CODE- 250/636

## 2018-01-04 RX ORDER — SODIUM CHLORIDE 0.9 % (FLUSH) 0.9 %
5-10 SYRINGE (ML) INJECTION AS NEEDED
Status: DISCONTINUED | OUTPATIENT
Start: 2018-01-04 | End: 2018-01-06 | Stop reason: HOSPADM

## 2018-01-04 RX ORDER — EPHEDRINE SULFATE 50 MG/ML
INJECTION, SOLUTION INTRAVENOUS
Status: COMPLETED
Start: 2018-01-04 | End: 2018-01-04

## 2018-01-04 RX ORDER — DOCUSATE SODIUM 100 MG/1
100 CAPSULE, LIQUID FILLED ORAL
Status: DISCONTINUED | OUTPATIENT
Start: 2018-01-04 | End: 2018-01-06 | Stop reason: HOSPADM

## 2018-01-04 RX ORDER — LIDOCAINE HYDROCHLORIDE 10 MG/ML
INJECTION INFILTRATION; PERINEURAL
Status: DISPENSED
Start: 2018-01-04 | End: 2018-01-05

## 2018-01-04 RX ORDER — SODIUM CHLORIDE 9 MG/ML
250 INJECTION, SOLUTION INTRAVENOUS AS NEEDED
Status: DISCONTINUED | OUTPATIENT
Start: 2018-01-04 | End: 2018-01-06 | Stop reason: HOSPADM

## 2018-01-04 RX ORDER — AMMONIA 15 % (W/V)
AMPUL (EA) INHALATION
Status: DISPENSED
Start: 2018-01-04 | End: 2018-01-05

## 2018-01-04 RX ORDER — MAG HYDROX/ALUMINUM HYD/SIMETH 200-200-20
30 SUSPENSION, ORAL (FINAL DOSE FORM) ORAL
Status: DISCONTINUED | OUTPATIENT
Start: 2018-01-04 | End: 2018-01-05 | Stop reason: HOSPADM

## 2018-01-04 RX ORDER — OXYTOCIN/RINGER'S LACTATE 20/1000 ML
125-500 PLASTIC BAG, INJECTION (ML) INTRAVENOUS ONCE
Status: ACTIVE | OUTPATIENT
Start: 2018-01-04 | End: 2018-01-05

## 2018-01-04 RX ORDER — IBUPROFEN 800 MG/1
800 TABLET ORAL
Status: DISCONTINUED | OUTPATIENT
Start: 2018-01-04 | End: 2018-01-04 | Stop reason: SDUPTHER

## 2018-01-04 RX ORDER — HYDROMORPHONE HYDROCHLORIDE 2 MG/ML
1 INJECTION, SOLUTION INTRAMUSCULAR; INTRAVENOUS; SUBCUTANEOUS
Status: DISCONTINUED | OUTPATIENT
Start: 2018-01-04 | End: 2018-01-06 | Stop reason: HOSPADM

## 2018-01-04 RX ORDER — PROMETHAZINE HYDROCHLORIDE 25 MG/1
25 TABLET ORAL
Status: DISCONTINUED | OUTPATIENT
Start: 2018-01-04 | End: 2018-01-06 | Stop reason: HOSPADM

## 2018-01-04 RX ORDER — ZOLPIDEM TARTRATE 5 MG/1
5 TABLET ORAL
Status: DISCONTINUED | OUTPATIENT
Start: 2018-01-04 | End: 2018-01-06 | Stop reason: HOSPADM

## 2018-01-04 RX ORDER — OXYTOCIN IN 5 % DEXTROSE 30/500 ML
PLASTIC BAG, INJECTION (ML) INTRAVENOUS
Status: COMPLETED
Start: 2018-01-04 | End: 2018-01-04

## 2018-01-04 RX ORDER — SODIUM CHLORIDE 0.9 % (FLUSH) 0.9 %
5-10 SYRINGE (ML) INJECTION EVERY 8 HOURS
Status: DISCONTINUED | OUTPATIENT
Start: 2018-01-04 | End: 2018-01-06

## 2018-01-04 RX ORDER — METHYLERGONOVINE MALEATE 0.2 MG/ML
0.2 INJECTION INTRAVENOUS ONCE
Status: ACTIVE | OUTPATIENT
Start: 2018-01-04 | End: 2018-01-05

## 2018-01-04 RX ORDER — SIMETHICONE 80 MG
80 TABLET,CHEWABLE ORAL
Status: DISCONTINUED | OUTPATIENT
Start: 2018-01-04 | End: 2018-01-06 | Stop reason: HOSPADM

## 2018-01-04 RX ORDER — NALOXONE HYDROCHLORIDE 0.4 MG/ML
0.4 INJECTION, SOLUTION INTRAMUSCULAR; INTRAVENOUS; SUBCUTANEOUS AS NEEDED
Status: DISCONTINUED | OUTPATIENT
Start: 2018-01-04 | End: 2018-01-06 | Stop reason: HOSPADM

## 2018-01-04 RX ORDER — SODIUM CHLORIDE, SODIUM LACTATE, POTASSIUM CHLORIDE, CALCIUM CHLORIDE 600; 310; 30; 20 MG/100ML; MG/100ML; MG/100ML; MG/100ML
125 INJECTION, SOLUTION INTRAVENOUS CONTINUOUS
Status: DISCONTINUED | OUTPATIENT
Start: 2018-01-04 | End: 2018-01-06 | Stop reason: HOSPADM

## 2018-01-04 RX ORDER — OXYCODONE AND ACETAMINOPHEN 5; 325 MG/1; MG/1
1 TABLET ORAL
Status: DISCONTINUED | OUTPATIENT
Start: 2018-01-04 | End: 2018-01-06 | Stop reason: HOSPADM

## 2018-01-04 RX ORDER — MISOPROSTOL 200 UG/1
800 TABLET ORAL ONCE
Status: COMPLETED | OUTPATIENT
Start: 2018-01-04 | End: 2018-01-04

## 2018-01-04 RX ORDER — OXYTOCIN IN 5 % DEXTROSE 30/500 ML
1-25 PLASTIC BAG, INJECTION (ML) INTRAVENOUS
Status: DISCONTINUED | OUTPATIENT
Start: 2018-01-04 | End: 2018-01-06 | Stop reason: HOSPADM

## 2018-01-04 RX ORDER — LIDOCAINE HYDROCHLORIDE AND EPINEPHRINE 20; 5 MG/ML; UG/ML
INJECTION, SOLUTION EPIDURAL; INFILTRATION; INTRACAUDAL; PERINEURAL AS NEEDED
Status: DISCONTINUED | OUTPATIENT
Start: 2018-01-04 | End: 2018-01-04 | Stop reason: HOSPADM

## 2018-01-04 RX ORDER — IBUPROFEN 800 MG/1
800 TABLET ORAL EVERY 8 HOURS
Status: DISCONTINUED | OUTPATIENT
Start: 2018-01-05 | End: 2018-01-06 | Stop reason: HOSPADM

## 2018-01-04 RX ORDER — NALOXONE HYDROCHLORIDE 0.4 MG/ML
0.4 INJECTION, SOLUTION INTRAMUSCULAR; INTRAVENOUS; SUBCUTANEOUS AS NEEDED
Status: DISCONTINUED | OUTPATIENT
Start: 2018-01-04 | End: 2018-01-05 | Stop reason: HOSPADM

## 2018-01-04 RX ORDER — FENTANYL/BUPIVACAINE/NS/PF 2-1250MCG
1-16 PREFILLED PUMP RESERVOIR EPIDURAL CONTINUOUS
Status: DISCONTINUED | OUTPATIENT
Start: 2018-01-04 | End: 2018-01-06 | Stop reason: HOSPADM

## 2018-01-04 RX ORDER — BUPIVACAINE HYDROCHLORIDE 2.5 MG/ML
INJECTION, SOLUTION EPIDURAL; INFILTRATION; INTRACAUDAL AS NEEDED
Status: DISCONTINUED | OUTPATIENT
Start: 2018-01-04 | End: 2018-01-04 | Stop reason: HOSPADM

## 2018-01-04 RX ORDER — HYDROCORTISONE ACETATE PRAMOXINE HCL 2.5; 1 G/100G; G/100G
CREAM TOPICAL AS NEEDED
Status: DISCONTINUED | OUTPATIENT
Start: 2018-01-04 | End: 2018-01-06 | Stop reason: HOSPADM

## 2018-01-04 RX ORDER — METHYLERGONOVINE MALEATE 0.2 MG/ML
0.2 INJECTION INTRAVENOUS ONCE
Status: COMPLETED | OUTPATIENT
Start: 2018-01-04 | End: 2018-01-04

## 2018-01-04 RX ORDER — ACETAMINOPHEN 325 MG/1
650 TABLET ORAL
Status: DISCONTINUED | OUTPATIENT
Start: 2018-01-04 | End: 2018-01-06 | Stop reason: HOSPADM

## 2018-01-04 RX ORDER — CALCIUM CARBONATE 200(500)MG
400 TABLET,CHEWABLE ORAL
Status: DISCONTINUED | OUTPATIENT
Start: 2018-01-04 | End: 2018-01-05 | Stop reason: HOSPADM

## 2018-01-04 RX ORDER — EPHEDRINE SULFATE 50 MG/ML
INJECTION, SOLUTION INTRAVENOUS
Status: DISPENSED
Start: 2018-01-04 | End: 2018-01-05

## 2018-01-04 RX ORDER — ONDANSETRON 2 MG/ML
4 INJECTION INTRAMUSCULAR; INTRAVENOUS
Status: DISCONTINUED | OUTPATIENT
Start: 2018-01-04 | End: 2018-01-05 | Stop reason: HOSPADM

## 2018-01-04 RX ADMIN — SODIUM CHLORIDE, SODIUM LACTATE, POTASSIUM CHLORIDE, AND CALCIUM CHLORIDE 100 ML/HR: 600; 310; 30; 20 INJECTION, SOLUTION INTRAVENOUS at 18:06

## 2018-01-04 RX ADMIN — EPHEDRINE SULFATE 50 MG: 50 INJECTION, SOLUTION INTRAVENOUS at 19:30

## 2018-01-04 RX ADMIN — SODIUM CHLORIDE, SODIUM LACTATE, POTASSIUM CHLORIDE, AND CALCIUM CHLORIDE 125 ML/HR: 600; 310; 30; 20 INJECTION, SOLUTION INTRAVENOUS at 06:48

## 2018-01-04 RX ADMIN — Medication 13 MILLI-UNITS/MIN: at 19:00

## 2018-01-04 RX ADMIN — MISOPROSTOL 800 MCG: 200 TABLET ORAL at 20:26

## 2018-01-04 RX ADMIN — LIDOCAINE HYDROCHLORIDE AND EPINEPHRINE 3 ML: 20; 5 INJECTION, SOLUTION EPIDURAL; INFILTRATION; INTRACAUDAL; PERINEURAL at 11:25

## 2018-01-04 RX ADMIN — Medication 10 ML/HR: at 18:03

## 2018-01-04 RX ADMIN — EPHEDRINE SULFATE 50 MG: 50 INJECTION, SOLUTION INTRAVENOUS at 19:26

## 2018-01-04 RX ADMIN — Medication 22 MILLI-UNITS/MIN: at 14:10

## 2018-01-04 RX ADMIN — BUPIVACAINE HYDROCHLORIDE 7 ML: 2.5 INJECTION, SOLUTION EPIDURAL; INFILTRATION; INTRACAUDAL at 11:29

## 2018-01-04 RX ADMIN — SODIUM CHLORIDE, SODIUM LACTATE, POTASSIUM CHLORIDE, AND CALCIUM CHLORIDE 999 ML/HR: 600; 310; 30; 20 INJECTION, SOLUTION INTRAVENOUS at 11:41

## 2018-01-04 RX ADMIN — SODIUM CHLORIDE, SODIUM LACTATE, POTASSIUM CHLORIDE, AND CALCIUM CHLORIDE 999 ML/HR: 600; 310; 30; 20 INJECTION, SOLUTION INTRAVENOUS at 11:46

## 2018-01-04 RX ADMIN — Medication 26 MILLI-UNITS/MIN: at 14:55

## 2018-01-04 RX ADMIN — METHYLERGONOVINE MALEATE 0.2 MG: 0.2 INJECTION INTRAVENOUS at 20:59

## 2018-01-04 RX ADMIN — SODIUM CHLORIDE, SODIUM LACTATE, POTASSIUM CHLORIDE, AND CALCIUM CHLORIDE 999 ML/HR: 600; 310; 30; 20 INJECTION, SOLUTION INTRAVENOUS at 10:15

## 2018-01-04 RX ADMIN — Medication 2 MILLI-UNITS/MIN: at 06:48

## 2018-01-04 RX ADMIN — IBUPROFEN 800 MG: 800 TABLET ORAL at 21:03

## 2018-01-04 RX ADMIN — Medication 10 ML: at 08:15

## 2018-01-04 RX ADMIN — Medication 10 ML/HR: at 11:28

## 2018-01-04 NOTE — IP AVS SNAPSHOT
[address] 
[phone] Patient: Ann Marie Cleaning MRN: [id number] :[id number] A check criselda indicates which time of day the medication should be taken. My Medications CONTINUE taking these medications Instructions Each Dose to Equal  
 Morning Noon Evening Bedtime PNV No12-Iron-FA-DSS-OM-3 29 mg iron-1 mg -50 mg Cpkd Your last dose was: Your next dose is: Take  by mouth.

## 2018-01-04 NOTE — PROGRESS NOTES
0730: discussed pain management, desires epidural for labor, will let nurse know when ready for an epidural.    0800: diana care rendered. Sitting up in bed.    0932:  Requesting epidural. Iv bolus preload for epidural started. 1025:vocera to dr Rosita Fonseca for epidural placement. 1055: aydee fajardo in for epidural. Sitting up at bedside for e pidural    1110: epidural in, test dose, epidural remove by crna    [phone] [id number]: epidural replaced by crna, repositioned, tilted to r, low fowlers    1140: f/c placed    1212: strip reviewed, sve, and iupc placed by dr Diaz Setting    1310: repositioned, mid fowlers    1431: strip reviewed, sve, and repeat back verbal order to continue increasing pitocin from dr Diaz Setting    1435: rated pain 4, declines using self dosing epidural button. Provided patient with button and instructed on use. Informed pt to call nurse when she does use it    1536: c/o increased pain, tearful, verbalizes she used epidural button 5 minutes ago without relief. Pt repositioned self to low fowlers, sve done, and repositioned to mid fowlers    1544: verbalizes repositioning helped with sacral pain     1558: repositioned to r side, peanut ball implemented. Dr Diaz Setting at bedside, sve done, repositioned back to r side, with peanut ball    1708: peanut ball removed. sve done by beni reaves. Repositioned tomid/hi fowlers.    Dr Diaz Setting in to see pt    [facility]: dr Leann Chavez at bedside for epidural redose

## 2018-01-04 NOTE — H&P
History & Physical    Name: Ann Marie Cleaning MRN: [id number]  SSN: [id number]    YOB: 1990  Age: 28 y.o. Sex: female        Subjective:     Estimated Date of Delivery: 18  OB History      Para Term  AB Living    4 2 2  1 2    SAB TAB Ectopic Molar Multiple Live Births    1 0   0 2          MsLucio Gong is admitted with pregnancy at 39w2d for induction of labor. Prenatal course was complicated by history of postpartum hemorrhage and transfusion. This resulted in abnormal blood antibody. . Please see prenatal records for details. Antibodies identified as anti-c and anti-s - blood is set up in case needed    Past Medical History:   Diagnosis Date    Antibody S titer positive in second trimester, antepartum 2015    Breast cyst 2009    excision, R    Gestational hypertension     with second pregnancy    Heart abnormality     pulmonary HTN with second pregnancy    Supervision of normal subsequent pregnancy 2014    SAB in May 2014     Past Surgical History:   Procedure Laterality Date    BREAST SURGERY PROCEDURE UNLISTED      HX WISDOM TEETH EXTRACTION       Social History     Occupational History    Not on file. Social History Main Topics    Smoking status: Never Smoker    Smokeless tobacco: Never Used    Alcohol use No    Drug use: No    Sexual activity: Yes     Partners: Male     Birth control/ protection: None     Family History   Problem Relation Age of Onset    Cancer Maternal Grandfather      skin    Cancer Maternal Grandmother      breast       Allergies   Allergen Reactions    [place] Other (comments)     Red face and body gets hot     Prior to Admission medications    Medication Sig Start Date End Date Taking? Authorizing Provider   PNV No12-Iron-FA-DSS-OM-3 29 mg iron-1 mg -50 mg CPKD Take  by mouth. Yes Historical Provider        Review of Systems: A comprehensive review of systems was negative except for that written in the HPI.     Objective: Vitals:  Vitals:    01/04/18 1222 01/04/18 1224 01/04/18 1229 01/04/18 1234   BP: 101/59      Pulse: 98      Resp:       Temp:       SpO2:  98% 97% 98%   Height:            Physical Exam:  Heart: Regular rate and rhythm  Lung: clear to auscultation throughout lung fields, no wheezes, no rales, no rhonchi and normal respiratory effort  Abdomen: soft, nontender  Fundus: soft and non tender  Perineum: blood absent, amniotic fluid absent  Cervical Exam: 2 cm dilated    50% effaced    -3 station    Presenting Part: cephalic  Cervical Position: mid position  Consistency: Soft  Membranes:  Artificial Rupture of Membranes; Amniotic Fluid: medium amount of clear fluid  Fetal Heart Rate: Reactive    Prenatal Labs:   Lab Results   Component Value Date/Time    Rubella, External immune 05/31/2017    GrBStrep, External Negative 12/06/2017    HBsAg, External neg 05/31/2017    HIV, External neg 05/31/2017    Gonorrhea, External neg 05/31/2017    Chlamydia, External neg 05/31/2017        Assessment/Plan:     Plan: Admit for Reassuring fetal status, Continue plan for vaginal delivery. Group B Strep was negative.     Signed By:  Meera Gayle MD     January 4, 2018

## 2018-01-04 NOTE — PROGRESS NOTES
Labor Progress Note  Patient seen, fetal heart rate and contraction pattern evaluated, patient examined.   Visit Vitals    /62 (BP 1 Location: Left arm, BP Patient Position: At rest;Head of bed elevated (Comment degrees))  Comment (BP Patient Position): mid fowlers    Pulse (!) 101    Temp 98.2 °F (36.8 °C)    Resp 14    Ht 5' 1\" (1.549 m)    LMP 04/04/2017    SpO2 99%       Physical Exam:  Cervical Exam:  7/80 %/-1/Mid  Membranes:  ruptured  Uterine Activity: Frequency: Every 2-3 minutes  Fetal Heart Rate: Reactive    Assessment/Plan:  Reassuring fetal status, Continue plan for vaginal delivery

## 2018-01-04 NOTE — PROGRESS NOTES
1/4/2018 8:39 AM  During AROM, Dr Anabella Sevilla verbally ordered pitocin to be titrated q15-20 minutes per MD order. VORB. MD order updated in [facility].

## 2018-01-04 NOTE — PROGRESS NOTES
Labor Progress Note  Patient seen, fetal heart rate and contraction pattern evaluated, patient examined. Visit Vitals    /59    Pulse 98    Temp 98.8 °F (37.1 °C)    Resp 14    Ht 5' 1\" (1.549 m)    LMP 04/04/2017    SpO2 98%       Physical Exam:  Cervical Exam:  4/50 %/-2/   Membranes:  ruptured  Uterine Activity: Frequency: Every 3-5 minutes  Fetal Heart Rate: Reactive    Assessment/Plan:  Reassuring fetal status, Continue plan for vaginal delivery. Difficult to see UC - IUPC placed.  Increase pitocin to establish adequate labor

## 2018-01-04 NOTE — PROGRESS NOTES
0700 Received SBAR report at bedside from Justice Beck RN; assuming temporary care of pt at this time  0705 VSS, Pt in right tilt position. Curos caps plaed on IV, LR and Pitocin infusing. 9243 SBAR at bedside; report off to PASCUAL Jung RN; relinquishing care of pt at this time.

## 2018-01-04 NOTE — ANESTHESIA PROCEDURE NOTES
Epidural Block    Start time: 1/4/2018 10:58 AM  End time: 1/4/2018 11:31 AM  Performed by: Jw Lowe  Authorized by: wJ Lowe     Pre-Procedure  Indication: labor epidural    Preanesthetic Checklist: patient identified, risks and benefits discussed, anesthesia consent, timeout performed and anesthesia consent    Timeout Time: 10:58        Epidural:   Patient position:  Seated  Prep region:  Lumbar  Prep: Betadine    Location:  L3-4    Needle and Epidural Catheter:   Needle Type:  Tuohy  Needle Gauge:  17 G  Injection Technique:  Loss of resistance using air  Attempts:  1  Catheter Size:  18 G  Events: no paresthesia and negative aspiration test    Test Dose:  Lidocaine 1.5% w/ epi and negative    Assessment:   Catheter Secured:  Tegaderm and tape  Insertion:  Uncomplicated  Patient tolerance:  Patient tolerated the procedure well with no immediate complications

## 2018-01-04 NOTE — PROGRESS NOTES
01/04/18 1:10 PM  CM met with patient to complete initial assessment and to begin discharge planning. Demographics were reviewed and confirmed. Patient does not work outside the home, as she is able to stay home with her two older children, ages 11 and 2. Patient's /FOB eJssie Salinas ([phone]) works and will have a few days away from work. FOB's family and relatives live close and are able to assist as needed. Patient plans to bottlefeed formula. Dr. James Kelly in [facility] will provide medical follow up for the baby. Has Medicaid services, does not currently have [address], but aware of how to obtain; understands how to add the baby to Medicaid.     Care Management Interventions  PCP Verified by CM:  (declined [facility] list)  Mode of Transport at Discharge: Self  Transition of Care Consult (CM Consult): Discharge Planning  Current Support Network: Lives with Spouse, Family Lives Nearby  Confirm Follow Up Transport: Family  Plan discussed with Pt/Family/Caregiver: Yes  Discharge Location  Discharge Placement: Home with outpatient services  DEVEN Sheridan

## 2018-01-04 NOTE — IP AVS SNAPSHOT
[address] 
 
 
 [address] [facility] 
[phone] Patient: Latrell Leiva MRN: [id number] :1990 About your hospitalization You were admitted on:  2018 You last received care in the:  [facility] You were discharged on:  2018 Why you were hospitalized Your primary diagnosis was:  Not on File Your diagnoses also included:  Pregnancy, Abnormal Antibody Titer Follow-up Information Follow up With Details Comments Contact Info Veronica Leon MD   [address] [facility] [address] [facility] 
[phone] Discharge Orders None A check criselda indicates which time of day the medication should be taken. My Medications CONTINUE taking these medications Instructions Each Dose to Equal  
 Morning Noon Evening Bedtime PNV No12-Iron-FA-DSS-OM-3 29 mg iron-1 mg -50 mg Cpkd Your last dose was: Your next dose is: Take  by mouth. Discharge Instructions POST DELIVERY DISCHARGE INSTRUCTIONS Name: Latrell Leiva YOB: 1990 Primary Diagnosis: Active Problems: 
  Pregnancy (10/6/2017) General:  
 
Diet/Diet Restrictions: 
Eight 8-ounce glasses of fluid daily (water, juices); avoid excessive caffeine intake. Meals/snacks as desired which are high in fiber and carbohydrates and low in fat and cholesterol. Medications:  
{Medication reconciliation information is now added to the patient's AVS automatically when it is printed. There is no need to use this SmartLink in discharge instructions. Highlight this text and delete it to clear this message} Physical Activity / Restrictions / Safety:  
 
Avoid heavy lifting, no more that 8 lbs. For 2-3 weeks; No driving while taking narcotic pain medication. Post  patients should not drive until pain free. No intercourse 4-6 weeks, no douching or tampon use. May resume exercise in 6 weeks. Discharge Instructions/Special Treatment/Home Care Needs:  
 
Continue prenatal vitamins. Continue to use squirt bottle with warm water on your episiotomy after each bathroom use until bleeding stops. If steri-strips applied to your incision, remove in 7 days. Take stool softeners daily. Call your doctor for the following:  
 
Fever over 101 degrees by mouth. Vaginal bleeding heavier than a normal menstrual period or lost larger than a golf ball. Red streaks or increased swelling of legs, painful red streaks on your breast. 
Painful urination, or increased pain, redness or discharge with your incision. Pain Management:  
 
Pain Management:  
Take Acetaminophen (Tylenol) or Ibuprofen (Advil, Motrin), as directed for pain. Use a warm Sitz bath 3 times daily to relieve episiotomy or hemorrhoidal discomfort. Heating pad to  incision as needed. For hemorrhoidal discomfort, use Tucks and Anusol cream as needed and directed. Follow-Up Care:  
 
Appointment with MD: No orders of the defined types were placed in this encounter. Telephone number: [phone] Signed By: Meera Gayle MD                                                                                                   Date: 2018 Time: 3:47 PM 
 
 
  
  
  
CTS Media Announcement We are excited to announce that we are making your provider's discharge notes available to you in [employer]. You will see these notes when they are completed and signed by the physician that discharged you from your recent hospital stay. If you have any questions or concerns about any information you see in [employer], please call the Health Information Department where you were seen or reach out to your Primary Care Provider for more information about your plan of care. Introducing [facility]! Dear Trey Goodwin: Thank you for requesting a Zannel account. Our records indicate that you already have an active Zannel account. You can access your account anytime at [email or web address] Did you know that you can access your hospital and ER discharge instructions at any time in Zannel? You can also review all of your test results from your hospital stay or ER visit. Additional Information If you have questions, please visit the Frequently Asked Questions section of the Zannel website at [email or web address] Remember, Zannel is NOT to be used for urgent needs. For medical emergencies, dial 911. Now available from your iPhone and Android! Providers Seen During Your Hospitalization Provider Specialty Primary office phone Anabelle Locke MD Obstetrics & Gynecology [phone] Your Primary Care Physician (PCP) Primary Care Physician Office Phone Office Fax Luis Bean [phone] You are allergic to the following Allergen Reactions [place] Other (comments) Red face and body gets hot Recent Documentation Height Breastfeeding? OB Status Smoking Status 1.549 m Unknown Recent pregnancy Never Smoker Emergency Contacts Name Discharge Info Relation Home Work Mobile Kishan Mireles DISCHARGE CAREGIVER [3] Spouse [3] [phone] Patient Belongings The following personal items are in your possession at time of discharge: 
  Dental Appliances: None  Visual Aid: Glasses      Home Medications: None   Jewelry: None  Clothing: At bedside    Other Valuables: None Please provide this summary of care documentation to your next provider. Signatures-by signing, you are acknowledging that this After Visit Summary has been reviewed with you and you have received a copy.   
  
 
  
    
    
 Patient Signature: ____________________________________________________________ Date:  ____________________________________________________________  
  
Cherylle Cumins Provider Signature:  ____________________________________________________________ Date:  ____________________________________________________________

## 2018-01-04 NOTE — ANESTHESIA PREPROCEDURE EVALUATION
Anesthetic History   No history of anesthetic complications            Review of Systems / Medical History  Patient summary reviewed, nursing notes reviewed and pertinent labs reviewed    Pulmonary  Within defined limits                 Neuro/Psych   Within defined limits           Cardiovascular  Within defined limits                     GI/Hepatic/Renal  Within defined limits              Endo/Other  Within defined limits           Other Findings              Physical Exam    Airway  Mallampati: II    Neck ROM: normal range of motion   Mouth opening: Normal     Cardiovascular  Regular rate and rhythm,  S1 and S2 normal,  no murmur, click, rub, or gallop             Dental         Pulmonary  Breath sounds clear to auscultation               Abdominal  GI exam deferred       Other Findings            Anesthetic Plan    ASA: 2  Anesthesia type: epidural      Post-op pain plan if not by surgeon: indwelling epidural catheter      Anesthetic plan and risks discussed with: Patient

## 2018-01-05 PROBLEM — R76.0 ABNORMAL ANTIBODY TITER: Status: ACTIVE | Noted: 2018-01-05

## 2018-01-05 PROCEDURE — 74011250637 HC RX REV CODE- 250/637: Performed by: OBSTETRICS & GYNECOLOGY

## 2018-01-05 PROCEDURE — 65270000029 HC RM PRIVATE

## 2018-01-05 RX ADMIN — Medication 10 ML: at 14:58

## 2018-01-05 RX ADMIN — IBUPROFEN 800 MG: 800 TABLET ORAL at 14:57

## 2018-01-05 NOTE — DISCHARGE SUMMARY
Obstetrical Discharge Summary     Name: Latrell Leiva MRN: [id number]  SSN: [id number]    YOB: 1990  Age: 28 y.o. Sex: female      Admit Date: 2018    Discharge Date: 2018     Admitting Physician: Veronica Leon MD     Attending Physician:  Veronica Leon MD     Admission Diagnoses: Pregnancy    Discharge Diagnoses:   Information for the patient's :  Fredis Rose, Male [[id number]]   Delivery of a 8 lb 5.2 oz (3.775 kg) male infant via Vaginal, Spontaneous Delivery on 2018 at 8:02 PM  by . Apgars were  and . Additional Diagnoses:   Hospital Problems  Date Reviewed: 2018          Codes Class Noted POA    Pregnancy ICD-10-CM: Z34.90  ICD-9-CM: V22.2  10/6/2017 Unknown             Lab Results   Component Value Date/Time    Rubella, External immune 2017    GrBStrep, External Negative 2017       Hospital Course: Normal hospital course following the delivery. Patient Instructions:   Current Discharge Medication List      CONTINUE these medications which have NOT CHANGED    Details   PNV No12-Iron-FA-DSS-OM-3 29 mg iron-1 mg -50 mg CPKD Take  by mouth. Condition: good  Reference my discharge instructions.     Follow-up Appointments   Procedures    FOLLOW UP VISIT Appointment in: 6 Weeks     Standing Status:   Standing     Number of Occurrences:   1     Order Specific Question:   Appointment in     Answer:   6 Weeks        Signed By:  Veronica Leon MD     2018

## 2018-01-05 NOTE — PROGRESS NOTES
Post-Partum Day Number 1 Progress Note    Cameron Schofieldo       Information for the patient's newbornДмитрий Paz, Male [[id number]]   Vaginal, Spontaneous Delivery   Patient doing well without significant complaint. Voiding without difficulty, normal lochia. Vitals:  Visit Vitals    /63    Pulse 98    Temp 98.7 °F (37.1 °C)    Resp 14    Ht 5' 1\" (1.549 m)    LMP 2017    SpO2 97%     Temp (24hrs), Av.9 °F (37.2 °C), Min:98.7 °F (37.1 °C), Max:99.1 °F (37.3 °C)        Exam:   Patient without distress. Abdomen soft, fundus firm, nontender                Perineum with normal lochia noted. Lower extremities are negative for swelling, cords or tenderness.     Labs:     Lab Results   Component Value Date/Time    WBC 11.8 2018 06:29 AM    WBC 14.1 10/23/2017 04:23 PM    WBC 13.3 2017 03:41 PM    WBC 17.3 10/18/2015 06:25 PM    WBC 19.0 2015 08:47 AM    WBC 10.0 2015 06:15 AM    WBC 15.5 2015 11:35 AM    WBC 21.0 2015 08:40 AM    WBC 12.5 2015 09:35 AM    WBC 9.5 2014 02:35 PM    HGB 9.4 2018 06:29 AM    HGB 10.4 10/23/2017 04:23 PM    HGB 13.2 2017 03:41 PM    HGB 11.5 10/18/2015 06:25 PM    HGB 8.3 2015 08:47 AM    HGB 8.9 2015 06:15 AM    HGB 8.4 2015 11:35 AM    HGB 8.1 2015 08:40 AM    HGB 9.2 2015 09:35 AM    HGB 13.0 2014 02:35 PM    HCT 29.9 2018 06:29 AM    HCT 32.0 10/23/2017 04:23 PM    HCT 38.1 2017 03:41 PM    HCT 36.0 10/18/2015 06:25 PM    HCT 26.9 2015 08:47 AM    HCT 29.3 2015 06:15 AM    HCT 27.6 2015 11:35 AM    HCT 25.9 2015 08:40 AM    HCT 27.6 2015 09:35 AM    HCT 39.0 2014 02:35 PM    PLATELET 806  06:29 AM    PLATELET 236 15/32/7139 04:23 PM    PLATELET 616  03:41 PM    PLATELET 922  06:25 PM    PLATELET 505  08:47 AM    PLATELET 573  06:15 AM    PLATELET 269 05/07/2015 11:35 AM    PLATELET 478 91/29/5449 08:40 AM    PLATELET 778 33/60/2374 09:35 AM    PLATELET 140 10/57/8704 02:35 PM    Hgb, External 10.4 10/23/2017    Hgb, External 13.2 05/31/2017    Hgb, External 13.0 06/02/2014    Hct, External 32.0 10/23/2017    Hct, External 38.1 05/31/2017    Hct, External 39.0 06/02/2014    Platelet cnt., External 268 10/23/2017    Platelet cnt., External 306 05/31/2017    Platelet cnt., External 282 06/02/2014       No results found for this or any previous visit (from the past 24 hour(s)). Assessment: Doing well, post partum day 1    Plan:  1. Continue routine postpartum and perineal care as well as maternal education.

## 2018-01-05 NOTE — PROGRESS NOTES
Pt started pushing, pt's states she is dizzy and doesn't feel well, pt's eyes rolling back in her head, pt encouraged to breathe and relax    Deadra Fu- Dr. Imelda Saavedra called to bedside due to pt's behavior    Arjun Endo- Dr. Imelda Saavedra arrived for delivery  2025- Dr. Imelda Saavedra at bedside to evaluate bleeding, will give cytotec      0200- Pt up to void, tolerated well, able to void 300ml, pericare reviewed, linens changed

## 2018-01-05 NOTE — L&D DELIVERY NOTE
Delivery Summary    Patient: Martha Paige MRN: [id number]  SSN: [id number]    YOB: 1990  Age: 28 y.o. Sex: female        Labor Events:    Labor: No    Rupture Date: 2018    Rupture Time: 7:35 AM    Rupture Type AROM    Amniotic Fluid Volume:      Amniotic Fluid Description: Clear  None    Induction:          Augmentation:      Labor Complications: Additional Complications:        Cervical Ripening:       None      Delivery Events:  Episiotomy: None    Laceration(s): Second degree perineal      Repaired:       Number of Repair Packets: 1    Suture Type and Size:         Estimated Blood Loss (ml):   700 cc       Information for the patient's newbornJasmyne Luevano Male [[id number]]     Delivery Summary - Baby    Delivery Date: 2018   Delivery Time: 8:02 PM   Delivery Type: Vaginal, Spontaneous Delivery  Sex:  male  Gestational Age: 44w2d  Delivery Clinician:     Living?: Living   Delivery Location: L&D             APGARS  One minute Five minutes Ten minutes   Skin Color:            Heart Rate:             Reflex Irritability:             Muscle Tone:           Respiration:             Total:   8 9           Presentation: Vertex  Position:        Resuscitation Method:  Suctioning-bulb; Tactile Stimulation     Meconium Stained: None    Cord Information: 3 Vessels   Complications: None  Cord Blood Sent?:  No    Blood Gases Sent?:  No    Placenta:  Date/Time:   8:08 PM  Removal: Spontaneous      Appearance: Normal     [place] Measurements:  Birth Weight: 3.775 kg    Birth Length: 49.5 cm   Head Circumference: 35 cm     Chest Circumference: 33 cm    Abdominal Girth: 31.5 cm    Other Providers:   Daphane Epley R;ALLISON LICONA;JAKE TANG;CRESCENCIO GARCIA;MO OVERTON Obstetrician;Primary Nurse;Primary  Nurse;Staff Nurse;Charge Nurse           Cord Blood Results:  Information for the patient's newbornJasmyne Luevano, Male [[id number]]   No results found for: Elyse Hancock PCTDIG, BILI, ABORHEXT, [address]    Information for the patient's newbornDerryl Glad, Male [[id number]]   No results found for: APH, APCO2, APO2, AHCO3, ABEC, ABDC, O2ST, SITE, [place], PHI, [place], PO2I, HCO3I, SO2I, IBD     Information for the patient's newbornDerryl Glad, Male [[id number]]   No results found for: EPHV, PCO2V, PO2V, HCO3V, O2STV, EBDV

## 2018-01-06 VITALS
HEART RATE: 86 BPM | SYSTOLIC BLOOD PRESSURE: 107 MMHG | HEIGHT: 61 IN | RESPIRATION RATE: 15 BRPM | DIASTOLIC BLOOD PRESSURE: 67 MMHG | OXYGEN SATURATION: 97 % | TEMPERATURE: 97.8 F

## 2018-01-06 PROCEDURE — 74011250637 HC RX REV CODE- 250/637: Performed by: OBSTETRICS & GYNECOLOGY

## 2018-01-06 RX ADMIN — IBUPROFEN 800 MG: 800 TABLET ORAL at 08:36

## 2018-01-06 NOTE — PROGRESS NOTES
Bedside and Verbal shift change report given to [employer], Rn (oncoming nurse) by Edouard Boykin RN (offgoing nurse). Report included the following information SBAR, Procedure Summary, Intake/Output, MAR and Recent Results.

## 2018-01-06 NOTE — PROGRESS NOTES
Post-Partum Day Number 2 Progress/Discharge Note    Patient doing well post-partum without significant complaint. She is voiding without difficulty, she reports normal lochia. She is ambulatiing without dizziness. Her pain is well controlled with oral pain medication. She is tolerating general diet. Wants circ. Vitals:  Patient Vitals for the past 8 hrs:   BP Temp Pulse Resp   18 0556 107/67 97.8 °F (36.6 °C) 86 15     Temp (24hrs), Av.1 °F (36.7 °C), Min:97.8 °F (36.6 °C), Max:98.6 °F (37 °C)        Exam:  Patient without distress. Lungs:  CTA bilaterally               CV: RRR with no rubs or gallops; no murmur               Abdomen soft, fundus firm at level of umbilicus/U-1, nontender               Lower extremities are negative for cords or tenderness; no swelling. Labs: No results found for this or any previous visit (from the past 24 hour(s)). Assessment and Plan:    Postpartum Day #2, S/P . Doing well.    - d/c home   - F/U 6wk postpartum check, sooner prn with Dr. Marco A Telles   - counseled R/B circ

## 2018-01-06 NOTE — PROGRESS NOTES
SBAR OUT Report: Mother    Verbal report given to ARIANNA Munson RN (full name & credentials) on this patient, who is now being transferred to MIU (unit) for routine progression of care. Report consisted of patient's Situation, Background, Assessment and Recommendations (SBAR). [place] ID bands were compared with the identification form, and verified with the patient and receiving nurse. Information from the SBAR and the [facility] Report was reviewed with the receiving nurse; opportunity for questions and clarification provided.

## 2018-01-06 NOTE — DISCHARGE INSTRUCTIONS
POST DELIVERY DISCHARGE INSTRUCTIONS    Name: Dakota Bucio  YOB: 1990  Primary Diagnosis: Active Problems:    Pregnancy (10/6/2017)        General:     Diet/Diet Restrictions:  Eight 8-ounce glasses of fluid daily (water, juices); avoid excessive caffeine intake. Meals/snacks as desired which are high in fiber and carbohydrates and low in fat and cholesterol. Medications:         Physical Activity / Restrictions / Safety:     Avoid heavy lifting, no more that 8 lbs. For 2-3 weeks; No driving while taking narcotic pain medication. Post  patients should not drive until pain free. No intercourse 4-6 weeks, no douching or tampon use. May resume exercise in 6 weeks. Discharge Instructions/Special Treatment/Home Care Needs:     Continue prenatal vitamins. Continue to use squirt bottle with warm water on your episiotomy after each bathroom use until bleeding stops. If steri-strips applied to your incision, remove in 7 days. Take stool softeners daily. Call your doctor for the following:     Fever over 101 degrees by mouth. Vaginal bleeding heavier than a normal menstrual period or lost larger than a golf ball. Red streaks or increased swelling of legs, painful red streaks on your breast.  Painful urination, or increased pain, redness or discharge with your incision. Pain Management:     Pain Management:   Take Acetaminophen (Tylenol) or Ibuprofen (Advil, Motrin), as directed for pain. Use a warm Sitz bath 3 times daily to relieve episiotomy or hemorrhoidal discomfort. Heating pad to  incision as needed. For hemorrhoidal discomfort, use Tucks and Anusol cream as needed and directed. Follow-Up Care:     Appointment with MD: No orders of the defined types were placed in this encounter.     Telephone number: [phone]    Signed By: Belia Ayers MD                                                                                                   Date: 2018 Time: 3:47 PM

## 2018-01-07 LAB
ABO + RH BLD: NORMAL
ANTIGENS PRESENT RBC DONR: NORMAL
BLD PROD TYP BPU: NORMAL
BLOOD BANK CMNT PATIENT-IMP: NORMAL
BLOOD GROUP ANTIBODIES SERPL: NORMAL
BLOOD GROUP ANTIBODIES SERPL: NORMAL
BPU ID: NORMAL
CROSSMATCH RESULT,%XM: NORMAL
SPECIMEN EXP DATE BLD: NORMAL
STATUS OF UNIT,%ST: NORMAL
UNIT DIVISION, %UDIV: 0

## 2018-02-21 ENCOUNTER — OFFICE VISIT (OUTPATIENT)
Dept: OBGYN CLINIC | Age: 28
End: 2018-02-21

## 2018-02-21 VITALS
BODY MASS INDEX: 32.74 KG/M2 | WEIGHT: 173.4 LBS | SYSTOLIC BLOOD PRESSURE: 120 MMHG | DIASTOLIC BLOOD PRESSURE: 70 MMHG | HEIGHT: 61 IN

## 2018-02-21 NOTE — PROGRESS NOTES
Postpartum evaluation    Fahad Colbert is a 28 y.o. female who presents for a postpartum exam.     She is now six weeks post normal spontaneous vaginal delivery. Her baby is doing well. She has had no menses since delivery. She has had the following significant problems since her delivery: none    The patient is bottle feeding without difficulty. The patient would like to use nothing for birth control. She is currently taking: no medications. She is due for her next AE in 3 months.      Visit Vitals    /70 (BP 1 Location: Right arm, BP Patient Position: Sitting)    Ht 5' 1\" (1.549 m)    Wt 173 lb 6.4 oz (78.7 kg)    Breastfeeding No    BMI 32.76 kg/m2       PHYSICAL EXAMINATION    Constitutional  · Appearance: well-nourished, well developed, alert, in no acute distress    HENT  · Head and Face: appears normal    Neck  · Inspection/Palpation: normal appearance, no masses or tenderness  · Lymph Nodes: no lymphadenopathy present  · Thyroid: gland size normal, nontender, no nodules or masses present on palpation    Breasts  · Inspection of Breasts: breasts symmetrical, no skin changes, no discharge present, nipple appearance normal, no skin retraction present  · Palpation of Breasts and Axillae: no masses present on palpation, no breast tenderness  · Axillary Lymph Nodes: no lymphadenopathy present    Gastrointestinal  · Abdominal Examination: abdomen non-tender to palpation, normal bowel sounds, no masses present  · Liver and spleen: no hepatomegaly present, spleen not palpable  · Hernias: no hernias identified    Genitourinary  · External Genitalia: normal appearance for age, no discharge present, no tenderness present, no inflammatory lesions present, no masses present, no atrophy present  · Vagina: normal vaginal vault without central or paravaginal defects, no discharge present, no inflammatory lesions present, no masses present  · Bladder: non-tender to palpation  · Urethra: appears normal  · Cervix: normal   · Uterus: normal size, shape and consistency  · Adnexa: no adnexal tenderness present, no adnexal masses present  · Perineum: perineum within normal limits, no evidence of trauma, no rashes or skin lesions present  · Anus: anus within normal limits, no hemorrhoids present  · Inguinal Lymph Nodes: no lymphadenopathy present    Skin  · General Inspection: no rash, no lesions identified    Neurologic/Psychiatric  · Mental Status:  · Orientation: grossly oriented to person, place and time  · Mood and Affect: mood normal, affect appropriate    Assessment:  Normal postpartum check    Plan:  RTO for AE.

## 2018-02-21 NOTE — PATIENT INSTRUCTIONS
Pelvic Exam: Care Instructions  Your Care Instructions    When your doctor examines all of your pelvic organs, it's called a pelvic exam. Two good reasons to have this kind of exam are to check for sexually transmitted infections (STIs) and to get a Pap test. A Pap test is also called a Pap smear. It checks for early changes that can lead to cancer of the cervix. Sometimes a pelvic exam is part of a regular checkup. In this case, you can do some things to make your test results as accurate as possible. · Try to schedule the exam when you don't have your period. · Don't use douches, tampons, or vaginal medicines, sprays, or powders for 24 hours before your exam.  · Don't have sex for 24 hours before your exam.  Other times, women have this kind of exam at any time of the month. This is because they have pelvic pain, bleeding, or discharge. Or they may have another pelvic problem. Before your exam, it's important to share some information with your doctor. For example, if you are a survivor of rape or sexual abuse, you can talk about any concerns you may have. Your doctor will also want to know if you are pregnant or use birth control. And he or she will want to hear about any problems, surgeries, or procedures you have had in your pelvic area. You will also need to tell your doctor when your last period was. Follow-up care is a key part of your treatment and safety. Be sure to make and go to all appointments, and call your doctor if you are having problems. It's also a good idea to know your test results and keep a list of the medicines you take. How is a pelvic exam done? · During a pelvic exam, you will:  ¨ Take off your clothes below the waist. You will get a paper or cloth cover to put over the lower half of your body. [place] on your back on an exam table. Your feet will be raised above you. Stirrups will support your feet. · The doctor will:  Simmie Raveling you to relax your knees.  Your knees need to lean out, toward the walls. ¨ Check the opening of your vagina for sores or swelling. ¨ Gently put a tool called a speculum into your vagina. It opens the vagina a little bit. You will feel some pressure. But if you are relaxed, it will not hurt. It lets your doctor see inside the vagina. ¨ Use a small brush, spatula, or swab to get a sample of cells, if you are having a Pap test or culture. The doctor then removes the speculum. ¨ Put on gloves and put one or two fingers of one hand into your vagina. The other hand goes on your lower belly. This lets your doctor feel your pelvic organs. You will probably feel some pressure. Try to stay relaxed. ¨ Put one gloved finger into your rectum and one into your vagina, if needed. This can also help check your pelvic organs. This exam takes about 10 minutes. At the end, you will get a washcloth or tissue to clean your vaginal area. It's normal to have some discharge after this exam. You can then get dressed. Some test results may be ready right away. But results from a culture or a Pap test may take several days or a few weeks. Why should you have a pelvic exam?  · You want to have recommended screening tests. This includes a Pap test.  · You think you have a vaginal infection. Signs include itching, burning, or unusual discharge. · You might have been exposed to a sexually transmitted infection (STI), such as chlamydia or herpes. · You have vaginal bleeding that is not part of your normal menstrual period. · You have pain in your belly or pelvis. · You have been sexually assaulted. A pelvic exam lets your doctor collect evidence and check for STIs. · You are pregnant. · You are having trouble getting pregnant. What are the risks of a pelvic exam?  There are no risks from a pelvic exam.  When should you call for help? Watch closely for changes in your health, and be sure to contact your doctor if you have any problems. Where can you learn more?   Go to http://bebo-jigna.info/. Enter D232 in the search box to learn more about \"Pelvic Exam: Care Instructions. \"  Current as of: October 13, 2016  Content Version: 11.4  © 9583-0380 [employer], [employer]. Care instructions adapted under license by [facility] (which disclaims liability or warranty for this information). If you have questions about a medical condition or this instruction, always ask your healthcare professional. [place] [address] any warranty or liability for your use of this information.

## 2019-12-11 ENCOUNTER — HOSPITAL ENCOUNTER (EMERGENCY)
Age: 29
Discharge: HOME | End: 2019-12-12
Attending: EMERGENCY MEDICINE
Payer: MEDICAID

## 2019-12-11 DIAGNOSIS — R07.89 ATYPICAL CHEST PAIN: Primary | ICD-10-CM

## 2019-12-11 PROCEDURE — 99283 EMERGENCY DEPT VISIT LOW MDM: CPT

## 2019-12-12 ENCOUNTER — APPOINTMENT (OUTPATIENT)
Dept: GENERAL RADIOLOGY | Age: 29
End: 2019-12-12
Attending: EMERGENCY MEDICINE
Payer: MEDICAID

## 2019-12-12 VITALS
RESPIRATION RATE: 17 BRPM | OXYGEN SATURATION: 98 % | SYSTOLIC BLOOD PRESSURE: 113 MMHG | TEMPERATURE: 98.3 F | HEIGHT: 61 IN | WEIGHT: 188 LBS | BODY MASS INDEX: 35.5 KG/M2 | DIASTOLIC BLOOD PRESSURE: 59 MMHG | HEART RATE: 77 BPM

## 2019-12-12 LAB
ALBUMIN SERPL-MCNC: 3.7 G/DL (ref 3.5–5)
ALBUMIN/GLOB SERPL: 0.9 {RATIO} (ref 1.1–2.2)
ALP SERPL-CCNC: 102 U/L (ref 45–117)
ALT SERPL-CCNC: 20 U/L (ref 12–78)
ANION GAP SERPL CALC-SCNC: 9 MMOL/L (ref 5–15)
APTT PPP: 28.5 SEC (ref 22.1–32)
AST SERPL-CCNC: 11 U/L (ref 15–37)
ATRIAL RATE: 82 BPM
BASOPHILS # BLD: 0.1 K/UL (ref 0–0.1)
BASOPHILS NFR BLD: 1 % (ref 0–1)
BILIRUB SERPL-MCNC: 0.3 MG/DL (ref 0.2–1)
BNP SERPL-MCNC: 58 PG/ML
BUN SERPL-MCNC: 10 MG/DL (ref 6–20)
BUN/CREAT SERPL: 11 (ref 12–20)
CALCIUM SERPL-MCNC: 9 MG/DL (ref 8.5–10.1)
CALCULATED P AXIS, ECG09: 35 DEGREES
CALCULATED R AXIS, ECG10: 66 DEGREES
CALCULATED T AXIS, ECG11: 51 DEGREES
CHLORIDE SERPL-SCNC: 106 MMOL/L (ref 97–108)
CO2 SERPL-SCNC: 25 MMOL/L (ref 21–32)
COMMENT, HOLDF: NORMAL
CREAT SERPL-MCNC: 0.95 MG/DL (ref 0.55–1.02)
D DIMER PPP FEU-MCNC: 0.38 MG/L FEU (ref 0–0.65)
DIAGNOSIS, 93000: NORMAL
DIFFERENTIAL METHOD BLD: ABNORMAL
EOSINOPHIL # BLD: 0.1 K/UL (ref 0–0.4)
EOSINOPHIL NFR BLD: 1 % (ref 0–7)
ERYTHROCYTE [DISTWIDTH] IN BLOOD BY AUTOMATED COUNT: 13.3 % (ref 11.5–14.5)
GLOBULIN SER CALC-MCNC: 4.3 G/DL (ref 2–4)
GLUCOSE SERPL-MCNC: 99 MG/DL (ref 65–100)
HCT VFR BLD AUTO: 42.1 % (ref 35–47)
HGB BLD-MCNC: 13.9 G/DL (ref 11.5–16)
IMM GRANULOCYTES # BLD AUTO: 0 K/UL (ref 0–0.04)
IMM GRANULOCYTES NFR BLD AUTO: 0 % (ref 0–0.5)
INR PPP: 1 (ref 0.9–1.1)
LYMPHOCYTES # BLD: 4.7 K/UL (ref 0.8–3.5)
LYMPHOCYTES NFR BLD: 35 % (ref 12–49)
MAGNESIUM SERPL-MCNC: 2.2 MG/DL (ref 1.6–2.4)
MCH RBC QN AUTO: 28.4 PG (ref 26–34)
MCHC RBC AUTO-ENTMCNC: 33 G/DL (ref 30–36.5)
MCV RBC AUTO: 85.9 FL (ref 80–99)
MONOCYTES # BLD: 0.9 K/UL (ref 0–1)
MONOCYTES NFR BLD: 7 % (ref 5–13)
NEUTS SEG # BLD: 7.7 K/UL (ref 1.8–8)
NEUTS SEG NFR BLD: 57 % (ref 32–75)
NRBC # BLD: 0 K/UL (ref 0–0.01)
NRBC BLD-RTO: 0 PER 100 WBC
P-R INTERVAL, ECG05: 114 MS
PHOSPHATE SERPL-MCNC: 3.4 MG/DL (ref 2.6–4.7)
PLATELET # BLD AUTO: 368 K/UL (ref 150–400)
PMV BLD AUTO: 8.7 FL (ref 8.9–12.9)
POTASSIUM SERPL-SCNC: 3.4 MMOL/L (ref 3.5–5.1)
PROT SERPL-MCNC: 8 G/DL (ref 6.4–8.2)
PROTHROMBIN TIME: 9.9 SEC (ref 9–11.1)
Q-T INTERVAL, ECG07: 372 MS
QRS DURATION, ECG06: 100 MS
QTC CALCULATION (BEZET), ECG08: 434 MS
RBC # BLD AUTO: 4.9 M/UL (ref 3.8–5.2)
SAMPLES BEING HELD,HOLD: NORMAL
SODIUM SERPL-SCNC: 140 MMOL/L (ref 136–145)
THERAPEUTIC RANGE,PTTT: NORMAL SECS (ref 58–77)
TROPONIN I SERPL-MCNC: <0.05 NG/ML
VENTRICULAR RATE, ECG03: 82 BPM
WBC # BLD AUTO: 13.6 K/UL (ref 3.6–11)

## 2019-12-12 PROCEDURE — 93005 ELECTROCARDIOGRAM TRACING: CPT

## 2019-12-12 PROCEDURE — 80053 COMPREHEN METABOLIC PANEL: CPT

## 2019-12-12 PROCEDURE — 85730 THROMBOPLASTIN TIME PARTIAL: CPT

## 2019-12-12 PROCEDURE — 83735 ASSAY OF MAGNESIUM: CPT

## 2019-12-12 PROCEDURE — 85379 FIBRIN DEGRADATION QUANT: CPT

## 2019-12-12 PROCEDURE — 71045 X-RAY EXAM CHEST 1 VIEW: CPT

## 2019-12-12 PROCEDURE — 36415 COLL VENOUS BLD VENIPUNCTURE: CPT

## 2019-12-12 PROCEDURE — 84100 ASSAY OF PHOSPHORUS: CPT

## 2019-12-12 PROCEDURE — 83880 ASSAY OF NATRIURETIC PEPTIDE: CPT

## 2019-12-12 PROCEDURE — 85025 COMPLETE CBC W/AUTO DIFF WBC: CPT

## 2019-12-12 PROCEDURE — 85610 PROTHROMBIN TIME: CPT

## 2019-12-12 PROCEDURE — 84484 ASSAY OF TROPONIN QUANT: CPT

## 2019-12-12 RX ORDER — FAMOTIDINE 20 MG/1
20 TABLET, FILM COATED ORAL 2 TIMES DAILY
Qty: 60 TAB | Refills: 0 | Status: SHIPPED | OUTPATIENT
Start: 2019-12-12

## 2019-12-12 RX ORDER — NAPROXEN 500 MG/1
500 TABLET ORAL
Qty: 20 TAB | Refills: 0 | Status: SHIPPED | OUTPATIENT
Start: 2019-12-12

## 2019-12-12 NOTE — ED PROVIDER NOTES
The patient is a 76-year-old female with a past medical history significant for gestational hypertension, pulmonary hypertension who presents to the ED with a complaint of intermittent episodes of midsternal chest pain described as sharp stabbing in nature, severity 5 out of 10, lasting approximately 10 to 15 minutes each time, nonradiating, without any aggravating or relieving factors. The patient is currently pain-free at this time. Onset was sudden as she was sitting and talking to her children. She denies any fever, cough, congestion, headache, neck pain or stiffness, back pain, nausea, vomiting, abdominal pain, diarrhea, constipation, dysuria, vaginal discharge, dizziness, extremity weakness and numbness, leg pain and swelling, prior history of the same. Her last menstrual period is current. She denies any prior surgical history as well.            Past Medical History:   Diagnosis Date    Antibody S titer positive in second trimester, antepartum 1/9/2015    Breast cyst 2009    excision, R    Gestational hypertension     with second pregnancy    Heart abnormality     pulmonary HTN with second pregnancy    Supervision of normal subsequent pregnancy 12/23/2014    SAB in May 2014       Past Surgical History:   Procedure Laterality Date    BREAST SURGERY PROCEDURE UNLISTED      HX WISDOM TEETH EXTRACTION           Family History:   Problem Relation Age of Onset    Cancer Maternal Grandfather         skin    Cancer Maternal Grandmother         breast       Social History     Socioeconomic History    Marital status:      Spouse name: Emeka    Number of children: Not on file    Years of education: Not on file    Highest education level: Not on file   Occupational History    Not on file   Social Needs    Financial resource strain: Not on file    Food insecurity:     Worry: Not on file     Inability: Not on file    Transportation needs:     Medical: Not on file     Non-medical: Not on file Tobacco Use    Smoking status: Never Smoker    Smokeless tobacco: Never Used   Substance and Sexual Activity    Alcohol use: No    Drug use: No    Sexual activity: Not Currently     Partners: Male     Birth control/protection: None   Lifestyle    Physical activity:     Days per week: Not on file     Minutes per session: Not on file    Stress: Not on file   Relationships    Social connections:     Talks on phone: Not on file     Gets together: Not on file     Attends Muslim service: Not on file     Active member of club or organization: Not on file     Attends meetings of clubs or organizations: Not on file     Relationship status: Not on file    Intimate partner violence:     Fear of current or ex partner: Not on file     Emotionally abused: Not on file     Physically abused: Not on file     Forced sexual activity: Not on file   Other Topics Concern    Not on file   Social History Narrative    Not on file         ALLERGIES: [place]    Review of Systems   All other systems reviewed and are negative. Vitals:    12/11/19 2359   BP: (!) 85/69   Pulse: 82   Resp: 18   Temp: 98.3 °F (36.8 °C)   SpO2: 98%   Weight: 85.3 kg (188 lb)   Height: 5' 1\" (1.549 m)            Physical Exam  Vitals signs and nursing note reviewed. CONSTITUTIONAL: Well-appearing; well-nourished; in no apparent distress  HEAD: Normocephalic; atraumatic  EYES: PERRL; EOM intact; conjunctiva and sclera are clear bilaterally. ENT: No rhinorrhea; normal pharynx with no tonsillar hypertrophy; mucous membranes pink/moist, no erythema, no exudate. NECK: Supple; non-tender; no cervical lymphadenopathy  CARD: Normal S1, S2; no murmurs, rubs, or gallops. Regular rate and rhythm. RESP: Normal respiratory effort; breath sounds clear and equal bilaterally; no wheezes, rhonchi, or rales. ABD: Normal bowel sounds; non-distended; non-tender; no palpable organomegaly, no masses, no bruits.   Back Exam: Normal inspection; no vertebral point tenderness, no CVA tenderness. Normal range of motion. EXT: Normal ROM in all four extremities; non-tender to palpation; no swelling or deformity; distal pulses are normal, no edema. SKIN: Warm; dry; no rash. NEURO:Alert and oriented x 3, coherent, AMANDA-XII grossly intact, sensory and motor are non-focal.        MDM  Number of Diagnoses or Management Options  Diagnosis management comments: Assessment: 69-year-old female who presents with intermittent episode of chest pain that has resolved at this time. Differential diagnosis include pleurisy, GERD,VTE, pancreatitis, dissection. Plan: EKG/chest x-ray:/Lab/IV fluid/antiemetic and analgesia/serial exam/ Monitor and Reevaluate. Amount and/or Complexity of Data Reviewed  Clinical lab tests: ordered and reviewed  Tests in the radiology section of CPT®: ordered and reviewed  Tests in the medicine section of CPT®: reviewed and ordered  Discussion of test results with the performing providers: yes  Decide to obtain previous medical records or to obtain history from someone other than the patient: yes  Obtain history from someone other than the patient: yes  Review and summarize past medical records: yes  Discuss the patient with other providers: yes  Independent visualization of images, tracings, or specimens: yes    Risk of Complications, Morbidity, and/or Mortality  Presenting problems: moderate  Diagnostic procedures: moderate  Management options: moderate    Patient Progress  Patient progress: stable         Procedures    ED EKG interpretation:  Rhythm: normal sinus rhythm; and regular . Rate (approx.): 82; Axis: normal; P wave: normal; QRS interval: normal ; ST/T wave: normal; in  Lead: Diffusely; Other findings: borderline ekg. This EKG was interpreted by Kim Gonzáles MD,ED Provider. XRAY INTERPRETATION (ED MD)  Chest Xray  No acute process seen. Normal heart size. No bony abnormalities. No infiltrate.   Gela Nolasco MD 12:13 AM    Progress Note:   Pt has been reexamined by Rena Duane, MD. Pt is feeling much better. Symptoms have improved. All available results have been reviewed with pt and any available family. Pt understands sx, dx, and tx in ED. Care plan has been outlined and questions have been answered. Pt is ready to go home. Will send home on and GERD instruction. Prescription of naproxen and Prilosec. Outpatient referral with PCP as needed. Written by Rena Duane, MD,6:23 AM    .   .

## 2019-12-12 NOTE — DISCHARGE INSTRUCTIONS

## 2019-12-12 NOTE — ED TRIAGE NOTES
Patient with 2 episodes of \"severe chest pain\". States first time was around 1730, and the second time around 2200. Describes as mid sternal, denies n/v, or diaphoresis. Reports being a little disoriented afterwards. States the pain lasted around 10-15 minutes each time.